# Patient Record
Sex: FEMALE | URBAN - METROPOLITAN AREA
[De-identification: names, ages, dates, MRNs, and addresses within clinical notes are randomized per-mention and may not be internally consistent; named-entity substitution may affect disease eponyms.]

---

## 2022-01-06 ENCOUNTER — APPOINTMENT (OUTPATIENT)
Dept: URBAN - METROPOLITAN AREA CLINIC 259 | Age: 37
Setting detail: DERMATOLOGY
End: 2022-01-06

## 2022-01-06 DIAGNOSIS — L65.0 TELOGEN EFFLUVIUM: ICD-10-CM

## 2022-01-06 PROCEDURE — OTHER COUNSELING: OTHER

## 2022-01-06 PROCEDURE — OTHER MIPS QUALITY: OTHER

## 2022-01-06 PROCEDURE — 99203 OFFICE O/P NEW LOW 30 MIN: CPT

## 2022-01-06 PROCEDURE — OTHER PRESCRIPTION: OTHER

## 2022-01-06 RX ORDER — SPIRONOLACTONE 50 MG/1
TABLET, FILM COATED ORAL
Qty: 30 | Refills: 2 | Status: ERX | COMMUNITY
Start: 2022-01-06

## 2022-01-06 ASSESSMENT — LOCATION DETAILED DESCRIPTION DERM: LOCATION DETAILED: RIGHT SUPERIOR PARIETAL SCALP

## 2022-01-06 ASSESSMENT — LOCATION ZONE DERM: LOCATION ZONE: SCALP

## 2022-01-06 ASSESSMENT — LOCATION SIMPLE DESCRIPTION DERM: LOCATION SIMPLE: SCALP

## 2022-01-06 NOTE — HPI: HAIR LOSS (PATIENT REPORTED)
Where Is Your Hair Loss Located?: Scalp
Please Specify Dates Of Recent Illness, Surgery, Weighloss, Childbirth, Or Increased Stress:: Increased stress
Additional Comments (Use Complete Sentences): Patient reports her other derm recommended supplements and rogiane. Patient reports she would like a second opinion due unresolved concerns.  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

## 2022-01-06 NOTE — PROCEDURE: MIPS QUALITY
Detail Level: Generalized
Quality 110: Preventive Care And Screening: Influenza Immunization: Influenza Immunization Ordered or Recommended, but not Administered due to system reason
Quality 130: Documentation Of Current Medications In The Medical Record: Current Medications Documented
Quality 431: Preventive Care And Screening: Unhealthy Alcohol Use - Screening: Patient identified as an unhealthy alcohol user when screened for unhealthy alcohol use using a systematic screening method and received brief counseling
Quality 226: Preventive Care And Screening: Tobacco Use: Screening And Cessation Intervention: Patient screened for tobacco use and is an ex/non-smoker

## 2022-01-10 NOTE — PROGRESS NOTES
[facility] Breast Surgery Consultation    HPI:   Karissa Rivera is a 36 year old female who is seen in consultation at the request of Dr. Ramírez for evaluation of increased lifetime risk of breast cancer.     She has a family history of her mother having breast cancer 1st at 40yo and then again at 41yo. 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 Family history also signficant for maternal grandmother with breast cancer and pancreatic cancer. Karissa had genetic testing and was negative last year. She was previously followed with high risk screening when she was living in [place]. Her last MRI was in 2018. She has had a prior right breast biopsy which was benign in 2017. Her sister is positive for FAP gene mutation.     Karissa is here to establish care for high risk screening. She would also like to meet with medical oncology to establish care.     She has no breast concerns today. Her last imaging was a mammogram in February 2021 which was benign.     Hormonal history:   menarche 15,  no children (may consider children yet/unsure) premenopausal, 11 years continuous OCP use, no HRT, no fertility treatment.     Family history of breast cancer: Yes - as above  Family history of ovarian cancer:  No  Family history of colon cancer: No  Family history of prostate cancer: No      Past Medical History:  reviewed      Current Outpatient Medications:      levETIRAcetam (KEPPRA PO), , Disp: , Rfl:      norethindrone-ethinyl estradiol (AUROVELA FE 1/20) 1-20 MG-MCG tablet, Take 1 tablet by mouth daily, Disp: , Rfl:      Zonisamide (ZONEGRAN PO), , Disp: , Rfl:     Past Surgical History:  reviewed        Allergies   Allergen Reactions     Adhesive Tape      Peeled off skin         Social History:  Social History     Socioeconomic History     Marital status: Single     Spouse name: Not on file     Number of children: Not on file     Years of education: Not on file     Highest education level:  "Not on file   Occupational History     Not on file   Tobacco Use     Smoking status: Never Smoker     Smokeless tobacco: Never Used   Substance and Sexual Activity     Alcohol use: Yes     Alcohol/week: 2.0 standard drinks     Types: 2 Standard drinks or equivalent per week     Drug use: Never     Sexual activity: Not on file   Other Topics Concern     Not on file   Social History Narrative     Not on file     Social Determinants of Health     Financial Resource Strain: Not on file   Food Insecurity: Not on file   Transportation Needs: Not on file   Physical Activity: Not on file   Stress: Not on file   Social Connections: Not on file   Intimate Partner Violence: Not on file   Housing Stability: Not on file        ROS:  The 10 point review of systems is negative other than noted in the HPI and above.    PE:  Vitals: /82   Pulse 110   Ht 1.753 m (5' 9\")   Wt 54.4 kg (120 lb)   SpO2 99%   BMI 17.72 kg/m    General appearance: well-nourished, sitting comfortably, no apparent distress  Psych: normal affect, pleasant  HEENT:  Head normocephalic and atraumatic, pupils equal and round, conjunctivae clear, mucous membranes moist, external ears and nose normal  Neck: Supple without thyromegaly or masses  Lungs: Respirations unlabored  Lymphatic: No cervical, or supraclavicular lymphadenopathy  Extremities: Without edema  Musculoskeletal:  Normal station and gait  Neurologic: nonfocal, grossly intact times four extremities, alert and oriented times three  Psychiatric: Mood and affect are appropriate  Skin: Without lesions or rashes    Breast:  A bilateral breast exam was performed in the supine position.. Bilateral breasts were palpated in a circumferential clockwise fashion including the supraclavicular and axillary areas.   Breasts are symmetric. The skin is normal. Nipples are everted bilaterally. There are no masses in either breast. Tissue is dense in the upper outer breast and central breast.       Lymph:   "     No supraclavicular/infraclavicular adenopathy.   Axillary adenopathy: none    Assessment/Plan: Karissa Rivera is a 36 year old who presents with increased lifetime risk of breast cancer with calculated risk via ABRAHAM score calculator of 45% (breast imaging calculated risk to be >50%). We discussed options for high risk patients including high risk screening alternating mammogram with breast MRI Q 6 months and prophylactic mastectomies. She would like to continue with high risk screening. Orders placed for MRI now and would then recommend mammogram in 6 months. She would like to meet with medical oncology as well to establish care which is reasonable. She can follow with either medical oncology or myself for high risk screening going forward. She is considering risk reducing mastectomies at some point in the future pending further need for biopsies/etc. We did discuss that it is very reasurring that she has had genetic testing and is negative for the BARD gene mutation that her mother was found to have and that her risk may not be as high as the ABRAHAM score calculator predicts given this is not taken into account.     We did discuss the options for mastectomies including simple mastectomies (going flat), skin sparing mastectomies or nipple sparing mastectomies with reconstruction. She is a good candidate for nipple sparing mastectomies if she were to proceed with surgery for risk reduction.      30 minutes total time spent on the date of this encounter doing: chart review, review of test results, patient visit, physical exam, education, counseling, developing plan of care, and documenting.    Vale Ha MD      Please route or send letter to:  Primary Care Provider (PCP) and Referring Provider

## 2022-01-11 ENCOUNTER — OFFICE VISIT (OUTPATIENT)
Dept: SURGERY | Facility: CLINIC | Age: 37
End: 2022-01-11
Payer: COMMERCIAL

## 2022-01-11 ENCOUNTER — PATIENT OUTREACH (OUTPATIENT)
Dept: ONCOLOGY | Facility: CLINIC | Age: 37
End: 2022-01-11

## 2022-01-11 VITALS
HEIGHT: 69 IN | SYSTOLIC BLOOD PRESSURE: 124 MMHG | DIASTOLIC BLOOD PRESSURE: 82 MMHG | BODY MASS INDEX: 17.77 KG/M2 | HEART RATE: 110 BPM | OXYGEN SATURATION: 99 % | WEIGHT: 120 LBS

## 2022-01-11 DIAGNOSIS — Z91.89 AT HIGH RISK FOR BREAST CANCER: Primary | ICD-10-CM

## 2022-01-11 PROCEDURE — 99244 OFF/OP CNSLTJ NEW/EST MOD 40: CPT | Performed by: SURGERY

## 2022-01-11 RX ORDER — NORETHINDRONE ACETATE AND ETHINYL ESTRADIOL 1MG-20(21)
1 KIT ORAL DAILY
COMMUNITY

## 2022-01-11 ASSESSMENT — MIFFLIN-ST. JEOR: SCORE: 1293.7

## 2022-01-11 NOTE — NURSING NOTE
Breast Patients    BREAST PATIENTS (ALL)    1-Do you have any of the following symptoms? Other: none - family history  2-In which breast are you having the symptoms? NA  3-Have you had a Mammogram? Other Location:  [facility]    -  Date:  01/2021  4-Have you ever had a breast cyst drained? Yes  Side: right    5-Have you ever had a breast biopsy? Right  6-Have you ever had a Breast Cancer? No   7-Is there a history of Breast Cancer in your family? Yes   Relationship to you:    Mother  Grandmother  8-Have you ever had Ovarian Cancer? No  9-Is there a history of Ovarian Cancer in your family? No  10-Summarize your caffeine intake (i.e. coffee, tea, chocolate, soda etc.): 1 cup of coffee daily    BREAST PATIENTS (FEMALE)    11-What age did your periods begin? 15  12-Date your last menstrual period began? 25  13-Number of full-term pregnancies: 0  14-Your age when your first child was born? N/A  15-Did you nurse your children? NA  16-Are you pregnant now? No  17-Have you begun menopause? No  18-Have you had either ovary removed?No  19-Do you have breast implants? No   20-Do you use hormone replacement therapy?  No  21-Have you taken oral contraceptive pills?  Yes, For how many years?  11  22-Have you had an intrauterine device (IUD) placed?  No  23-What is your current bra size?  32 SUNG Bliss RN-BSN

## 2022-01-20 ENCOUNTER — RX ONLY (RX ONLY)
Age: 37
End: 2022-01-20

## 2022-01-20 RX ORDER — SPIRONOLACTONE 25 MG/1
TABLET, FILM COATED ORAL
Qty: 60 | Refills: 3 | Status: ERX | COMMUNITY
Start: 2022-01-20

## 2022-01-27 ENCOUNTER — HOSPITAL ENCOUNTER (OUTPATIENT)
Dept: MRI IMAGING | Facility: CLINIC | Age: 37
Discharge: HOME | End: 2022-01-27
Attending: SURGERY | Admitting: SURGERY
Payer: COMMERCIAL

## 2022-01-27 DIAGNOSIS — Z91.89 AT HIGH RISK FOR BREAST CANCER: ICD-10-CM

## 2022-01-27 PROCEDURE — 255N000002 HC RX 255 OP 636: Performed by: SURGERY

## 2022-01-27 PROCEDURE — 77049 MRI BREAST C-+ W/CAD BI: CPT

## 2022-01-27 PROCEDURE — A9585 GADOBUTROL INJECTION: HCPCS | Performed by: SURGERY

## 2022-01-27 RX ORDER — GADOBUTROL 604.72 MG/ML
5 INJECTION INTRAVENOUS ONCE
Status: COMPLETED | OUTPATIENT
Start: 2022-01-27 | End: 2022-01-27

## 2022-01-27 RX ADMIN — GADOBUTROL 5 ML: 604.72 INJECTION INTRAVENOUS at 16:46

## 2022-01-28 ENCOUNTER — TELEPHONE (OUTPATIENT)
Dept: MAMMOGRAPHY | Facility: CLINIC | Age: 37
End: 2022-01-28
Payer: COMMERCIAL

## 2022-01-28 NOTE — TELEPHONE ENCOUNTER
Karissa Rivera was called and given her 1/27/2022 Breast MRI Results:      FINDINGS:      Right Breast: There is marked  background parenchymal enhancement.     There are no areas of suspicious enhancement or lymphadenopathy.     Left Breast: There is marked  background parenchymal enhancement.     There are no areas of suspicious enhancement or lymphadenopathy.                                                                      IMPRESSION:   BI-RADS 1, NEGATIVE. No MR evidence of malignancy.      RECOMMENDED FOLLOW-UP:  Recommend routine annual screening mammography and breast MRI as  clinically indicated.      ELLEN LINDA MD       She will continue with her usual High risk Breast imaging routine.    Jennifer Rudolph BSN, RN  Procedure Nurse  [facility]  [phone]

## 2022-04-03 NOTE — PROGRESS NOTES
Patient scheduled for consult with Sonya Collier GC, on 3/30/22 but did not attend or cancel appointment. Request for no-show letter initiated and referral resent to NPS for completion of reschedule.

## 2022-06-23 ENCOUNTER — APPOINTMENT (OUTPATIENT)
Dept: URBAN - METROPOLITAN AREA CLINIC 259 | Age: 37
Setting detail: DERMATOLOGY
End: 2022-06-27

## 2022-06-23 VITALS — HEIGHT: 69 IN | WEIGHT: 120 LBS

## 2022-06-23 DIAGNOSIS — B07.8 OTHER VIRAL WARTS: ICD-10-CM

## 2022-06-23 DIAGNOSIS — L64.8 OTHER ANDROGENIC ALOPECIA: ICD-10-CM

## 2022-06-23 PROCEDURE — 99213 OFFICE O/P EST LOW 20 MIN: CPT

## 2022-06-23 PROCEDURE — OTHER PRESCRIPTION: OTHER

## 2022-06-23 PROCEDURE — OTHER MIPS QUALITY: OTHER

## 2022-06-23 PROCEDURE — OTHER COUNSELING: OTHER

## 2022-06-23 RX ORDER — SPIRONOLACTONE 25 MG/1
TABLET, FILM COATED ORAL
Qty: 180 | Refills: 3 | Status: ERX | COMMUNITY
Start: 2022-06-23

## 2022-06-23 ASSESSMENT — LOCATION ZONE DERM
LOCATION ZONE: FEET
LOCATION ZONE: SCALP

## 2022-06-23 ASSESSMENT — LOCATION DETAILED DESCRIPTION DERM
LOCATION DETAILED: RIGHT PLANTAR FOREFOOT OVERLYING 4TH METATARSAL
LOCATION DETAILED: LEFT SUPERIOR PARIETAL SCALP

## 2022-06-23 ASSESSMENT — LOCATION SIMPLE DESCRIPTION DERM
LOCATION SIMPLE: RIGHT PLANTAR SURFACE
LOCATION SIMPLE: SCALP

## 2022-07-22 ENCOUNTER — HOSPITAL ENCOUNTER (OUTPATIENT)
Dept: MAMMOGRAPHY | Facility: CLINIC | Age: 37
Discharge: HOME | End: 2022-07-22
Attending: SURGERY | Admitting: SURGERY
Payer: COMMERCIAL

## 2022-07-22 DIAGNOSIS — Z91.89 AT HIGH RISK FOR BREAST CANCER: ICD-10-CM

## 2022-07-22 PROCEDURE — 77067 SCR MAMMO BI INCL CAD: CPT

## 2022-12-06 ENCOUNTER — OFFICE VISIT (OUTPATIENT)
Dept: NEUROLOGY | Facility: CLINIC | Age: 37
End: 2022-12-06
Payer: COMMERCIAL

## 2022-12-06 VITALS
HEIGHT: 69 IN | HEART RATE: 103 BPM | TEMPERATURE: 97 F | BODY MASS INDEX: 17.77 KG/M2 | WEIGHT: 120 LBS | SYSTOLIC BLOOD PRESSURE: 116 MMHG | DIASTOLIC BLOOD PRESSURE: 83 MMHG

## 2022-12-06 DIAGNOSIS — G40.909 NONINTRACTABLE EPILEPSY WITHOUT STATUS EPILEPTICUS, UNSPECIFIED EPILEPSY TYPE (H): Primary | ICD-10-CM

## 2022-12-06 LAB — LEVETIRACETAM SERPL-MCNC: 21.8 ΜG/ML (ref 10–40)

## 2022-12-06 RX ORDER — SPIRONOLACTONE 25 MG/1
25 TABLET ORAL 2 TIMES DAILY
COMMUNITY
Start: 2022-09-19

## 2022-12-06 RX ORDER — LEVETIRACETAM 750 MG/1
750 TABLET ORAL 2 TIMES DAILY
Qty: 180 TABLET | Refills: 3 | Status: SHIPPED | OUTPATIENT
Start: 2022-12-06 | End: 2023-12-31

## 2022-12-06 RX ORDER — ZONISAMIDE 100 MG/1
200 CAPSULE ORAL AT BEDTIME
Qty: 180 CAPSULE | Refills: 3 | Status: SHIPPED | OUTPATIENT
Start: 2022-12-06 | End: 2024-02-12

## 2022-12-06 RX ORDER — ZONISAMIDE 100 MG/1
CAPSULE ORAL
COMMUNITY
Start: 2022-10-24 | End: 2022-12-06

## 2022-12-06 RX ORDER — LEVETIRACETAM 750 MG/1
1 TABLET ORAL 2 TIMES DAILY
COMMUNITY
Start: 2022-03-03 | End: 2022-12-06

## 2022-12-06 ASSESSMENT — PAIN SCALES - GENERAL: PAINLEVEL: NO PAIN (0)

## 2022-12-06 NOTE — LETTER
"2022     RE: Karissa Rivera  : 1985   MRN: [id number]      Dear Colleague,    Thank you for referring your patient, Karissa Rivera, to the [facility] EPILEPSY CARE at [facility]. Please see a copy of my visit note below.    [facility] Epilepsy Care History and Physical       Patient:  Karissa Rivera  :  1985   Age:  37 year old   Today's Office Visit:  2022    Referring Provider:    Referred Self, MD  No address on file    History of Present Illness:  Karissa Rivera is 37 year old right handed who presents with history of epilepsy. She is transferring care from [facility] (she lost confidence in provider regarding family planning and lack of medical knowledge). Seizure started at age elementary school, early on seizure consisted of \"head shaking at the sun\", early on she took depakote. She transitioned to lamotrigine because depakote impacted learning, she took lamotrigine and depakote. After puberty she generalized tonic-clonic convulsion. She recall one twitch and she dropped coffee mug age 11.   Her seizure stopped after started zonisamide since . She has no cognitive slowing or kidney stones on zonisamide. She has \"runny nose and drowsiness from zonisamide\". She has fatigue which may also be secondary to levetiracetam. No mood issues.   Seizure type 1: In elementary school she had episodes in which she \"looked at sun and shook her head\". I suspect these might have been absence, nonetheless, they stopped with antiepileptic drug.   Seizure type 2: generalized tonic-clonic convulsion, these started in puberty and stopped after starting zonisamide in . The ketogenic also helped her convulsions.   Antiepileptic drug:   Zonisamide 100 mg at night   Levetiracetam 750 mg twice a day   Epilepsy Risk Factors:  Patient has no history of encephalitis/meningitis, no history of stroke, no history of tumor, no history of traumatic brain injury.  The " patient had a normal birth and delivery.  No developmental delays noted.  No febrile seizures.  No family members with epilepsy.   Aunt had fainting spells.   Precipitating factors:   photosensitive   Current Outpatient Medications   Medication Sig Dispense Refill     levETIRAcetam (KEPPRA) 750 MG tablet Take 1 tablet by mouth 2 times daily       norethindrone-ethinyl estradiol (JUNEL FE 1/20) 1-20 MG-MCG tablet Take 1 tablet by mouth daily       spironolactone (ALDACTONE) 25 MG tablet Take 25 mg by mouth 2 times daily       zonisamide (ZONEGRAN) 100 MG capsule        levETIRAcetam (KEPPRA PO)        Zonisamide (ZONEGRAN PO)        Perceived AED Side Effects: Yes  Medication Notes:   AED Medication Compliance:  compliant most of the time  Using a pill box:  Yes  Past AEDs:    Depakote - caused learning issues   Lamotrigine - no side effects   Levetiracetam - 750 mg twice a day currently, in high school they tired to increase levetiracetam and she had side effects.   Past Medical History:   Diagnosis Date     Seizures (H)       No past surgical history on file.  Family History   Problem Relation Age of Onset     Cerebrovascular Disease Mother      Breast Cancer Mother      Gallbladder Disease Mother      Breast Cancer Father      Cancer Father      Gallbladder Disease Father      Breast Cancer Sister         high risk     Colon Cancer Sister         high risk      Psycho-Social History: Karissa Rivera currently lives with boyfriend, she is in school for some master level classes and plans to go into marriage and family counseling. 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 Born in [place] and moved to [place] age 9, then moved to [place] age [age] and went to high school, then moved to [place]. Moved back to [place] 2020.   Currently, patient denies feeling depressed, denies feeling anhedonia, denies suicidal  thoughts, and denies having feelings of excessive  "guilt/worthlessness.  Does not smoke, rare alcohol use, no recreational drug use. We reviewed importance of mental and emotional wellbeing and impact on health.   Driving:  Currently patient is:  Driving: Patient was made aware of state driving laws. Currently meets criteria to continue to drive.  Previous Evaluations for Epilepsy:   EEG: none on file, we will have to get from [facility] data   MRI of Brain: this was completed in 2000 in [place]  Exam:    /83 (BP Location: Left arm, Patient Position: Sitting, Cuff Size: Adult Regular)   Pulse 103   Temp 97  F (36.1  C) (Temporal)   Ht 5' 9\" (175.3 cm)   Wt 120 lb (54.4 kg)   BMI 17.72 kg/m       Wt Readings from Last 5 Encounters:   12/06/22 120 lb (54.4 kg)   01/11/22 120 lb (54.4 kg)       Alert, orientated, speech is fluent, pupils are equal, round, and reactive to light, face symmetric, no pronator drip, equal  strength, reflexes are symmetric, normal to light touch with no sensory deficits noted, finger to nose normal, no focal deficits noted.Gait is stable. Able to tandem gait.       Impression:    Generalized Epilepsy     Discussion:   37-year-old female presents with generalized epilepsy based on clinical presentation.  She states her seizure stopped when she started taking zonisamide.  She would like to plan for her future pregnancy.  We spent an extensive amount of time reviewing seizure medications and teratogenicity.  She is not wanting to take lamotrigine and feels it was not helpful for her in the past.  I reviewed the existing literature on zonisamide in the pregnancy registries that had around 112 patients on this medication with less than 5% risk of teratogenicity.  Since Zonegran is an important seizure medication for her I encouraged her to continue it with levetiracetam.  I encouraged her to pursue her family planning goals based on existing knowledge of zonisamide teratogenicity.      Plan :      Continue levetiracetam 750 mg twice a day "   Continue zonisamide 200 mg pm   Check levels   Recommended ambulatory EEG, sleep 4 hours night prior EEG hookup    Need to get records from [facility] and [place]  For pregnancy - we may proceed with zonisamide and levetiracetam.     I spent 71 minutes in total today to provide comprehensive  medical care.   I spent 5 minutes writing the note and placing orders.   I spent 2 minutes  reviewing the chart.     The rest of the time was spent with the patient in face to face interview. During this time key medical decisions were made with review of medical chart prior to visit, visit with patient, counseling/education, and post visit work, including documentation of note on the day of visit. I addressed all questions the patient/caregiver raised in regards to epilepsy or related medical questions.       Christal Murray MD   Epilepsy Staff

## 2022-12-06 NOTE — LETTER
Patient:  Karissa Rivera  :   1985  MRN:     [id number]        Ms.Anne DINA Rivera  [address] [address]        2022    Dear ,    We are writing to inform you of your test results.    Your test results fall within the expected range(s) or remain unchanged from previous results.  Please continue with current treatment plan.    Resulted Orders   Keppra (Levetiracetam) Level   Result Value Ref Range    Keppra (Levetiracetam) Level 21.8 10.0 - 40.0  g/mL   Zonisamide Level Quantitative   Result Value Ref Range    Zonisamide Level Quant 10 10 - 40 ug/mL      Comment:      INTERPRETIVE INFORMATION: Zonisamide    Therapeutic range: Not well established.    Toxic: Greater than 80 ug/mL    The proposed therapeutic range for seizure control is 10-40   ug/mL. Toxic concentrations may cause coma, seizures and   cardiac abnormalities. Pharmacokinetics varies widely,   particularly with co-medications and/or compromised renal   function.  Performed By: [facility]  [address] [place] [id number]  : Frankie Montano MD, PhD       Christal Murray MD               [id number]  1985

## 2022-12-06 NOTE — PROGRESS NOTES
"[facility]       Patient:  Karissa Rivera  :  1985   Age:  37 year old   Today's Office Visit:  2022    Referring Provider:    Referred Self, MD  No address on file    History of Present Illness:  Karissa Rivera is 37 year old right handed who presents with history of epilepsy. She is transferring care from [facility] (she lost confidence in provider regarding family planning and lack of medical knowledge). Seizure started at age elementary school, early on seizure consisted of \"head shaking at the sun\", early on she took depakote. She transitioned to lamotrigine because depakote impacted learning, she took lamotrigine and depakote. After puberty she generalized tonic-clonic convulsion. She recall one twitch and she dropped coffee mug age 11.   Her seizure stopped after started zonisamide since . She has no cognitive slowing or kidney stones on zonisamide. She has \"runny nose and drowsiness from zonisamide\". She has fatigue which may also be secondary to levetiracetam. No mood issues.   Seizure type 1: In elementary school she had episodes in which she \"looked at sun and shook her head\". I suspect these might have been absence, nonetheless, they stopped with antiepileptic drug.   Seizure type 2: generalized tonic-clonic convulsion, these started in puberty and stopped after starting zonisamide in . The ketogenic also helped her convulsions.   Antiepileptic drug:   Zonisamide 100 mg at night   Levetiracetam 750 mg twice a day   Epilepsy Risk Factors:  Patient has no history of encephalitis/meningitis, no history of stroke, no history of tumor, no history of traumatic brain injury.  The patient had a normal birth and delivery.  No developmental delays noted.  No febrile seizures.  No family members with epilepsy.   Aunt had fainting spells.   Precipitating factors:   photosensitive   Current Outpatient Medications   Medication Sig Dispense Refill     levETIRAcetam " (KEPPRA) 750 MG tablet Take 1 tablet by mouth 2 times daily       norethindrone-ethinyl estradiol (JUNEL FE 1/20) 1-20 MG-MCG tablet Take 1 tablet by mouth daily       spironolactone (ALDACTONE) 25 MG tablet Take 25 mg by mouth 2 times daily       zonisamide (ZONEGRAN) 100 MG capsule        levETIRAcetam (KEPPRA PO)        Zonisamide (ZONEGRAN PO)        Perceived AED Side Effects: Yes  Medication Notes:   AED Medication Compliance:  compliant most of the time  Using a pill box:  Yes  Past AEDs:    Depakote - caused learning issues   Lamotrigine - no side effects   Levetiracetam - 750 mg twice a day currently, in high school they tired to increase levetiracetam and she had side effects.   Past Medical History:   Diagnosis Date     Seizures (H)       No past surgical history on file.  Family History   Problem Relation Age of Onset     Cerebrovascular Disease Mother      Breast Cancer Mother      Gallbladder Disease Mother      Breast Cancer Father      Cancer Father      Gallbladder Disease Father      Breast Cancer Sister         high risk     Colon Cancer Sister         high risk      Psycho-Social History: Karissa Rivera currently lives with boyfriend, she is in school for some master level classes and plans to go into marriage and family counseling. 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 Born in [place] and moved to [place] age 9, then moved to [place] age [age] and went to high school, then moved to [place]. Moved back to [place] 2020.   Currently, patient denies feeling depressed, denies feeling anhedonia, denies suicidal  thoughts, and denies having feelings of excessive guilt/worthlessness.  Does not smoke, rare alcohol use, no recreational drug use. We reviewed importance of mental and emotional wellbeing and impact on health.   Driving:  Currently patient is:  Driving: Patient was made aware of state driving laws. Currently meets criteria to continue to  "drive.  Previous Evaluations for Epilepsy:   EEG: none on file, we will have to get from [facility] data   MRI of Brain: this was completed in 2000 in [place]  Exam:    /83 (BP Location: Left arm, Patient Position: Sitting, Cuff Size: Adult Regular)   Pulse 103   Temp 97  F (36.1  C) (Temporal)   Ht 5' 9\" (175.3 cm)   Wt 120 lb (54.4 kg)   BMI 17.72 kg/m       Wt Readings from Last 5 Encounters:   12/06/22 120 lb (54.4 kg)   01/11/22 120 lb (54.4 kg)       Alert, orientated, speech is fluent, pupils are equal, round, and reactive to light, face symmetric, no pronator drip, equal  strength, reflexes are symmetric, normal to light touch with no sensory deficits noted, finger to nose normal, no focal deficits noted.Gait is stable. Able to tandem gait.       Impression:    Generalized Epilepsy     Discussion:   37-year-old female presents with generalized epilepsy based on clinical presentation.  She states her seizure stopped when she started taking zonisamide.  She would like to plan for her future pregnancy.  We spent an extensive amount of time reviewing seizure medications and teratogenicity.  She is not wanting to take lamotrigine and feels it was not helpful for her in the past.  I reviewed the existing literature on zonisamide in the pregnancy registries that had around 112 patients on this medication with less than 5% risk of teratogenicity.  Since Zonegran is an important seizure medication for her I encouraged her to continue it with levetiracetam.  I encouraged her to pursue her family planning goals based on existing knowledge of zonisamide teratogenicity.      Plan :      Continue levetiracetam 750 mg twice a day   Continue zonisamide 200 mg pm   Check levels   Recommended ambulatory EEG, sleep 4 hours night prior EEG hookup    Need to get records from [facility] and [place]  For pregnancy - we may proceed with zonisamide and levetiracetam.       I spent 71 minutes in total today to provide comprehensive  " medical care.   I spent 5 minutes writing the note and placing orders.   I spent 2 minutes  reviewing the chart.     The rest of the time was spent with the patient in face to face interview. During this time key medical decisions were made with review of medical chart prior to visit, visit with patient, counseling/education, and post visit work, including documentation of note on the day of visit. I addressed all questions the patient/caregiver raised in regards to epilepsy or related medical questions.           Christal Murray MD   Epilepsy Staff

## 2022-12-06 NOTE — PATIENT INSTRUCTIONS
Continue levetiracetam 750 mg twice a day   Continue zonisamide 200 mg pm   Check levels   Recommend ambulatory EEG, sleep 4 hours night prior EEG hookup  - do not drive sleep deprived   Need to get records from [facility] and [place]  For pregnancy - we may proceed with zonisamide and levetiracetam, talk to OB get off OCP, and prenatal vitamin.       AMBULATORY EEG  I ordered an ambulatory EEG on this visit.  EEG test will allow us to look for seizure like activity. An ambulatory EEG is a take-home EEG.  You will have electrodes put on your scalp at the clinic and a head box that you can wear as a bag.  You will have to come to clinic on subsequent days to have EEG data downloaded and electrodes re-glued.     ambulatory EEG options: 24 hour EEG. Day 1 come to [facility] and get EEG put on head and you may go home. Day 2: Come to clinic to have EEG leads removed.     You have to schedule an appointment for each visit. If you have a target spell or seizure press the event button and document what you felt or experienced. Please schedule this study at your convience. It is best to schedule a follow-up in person visit to review your EEG results and clinical implications.  If you are not able to come into the clinic for an appointment, I am happy to review your results over the phone.  Kindly, schedule the most appropriate phone visit for you and your schedule after the EEG is completed. If you have MyChart, I may also send you the results via [email or web address] messaging if appropriate.     If patient has a target spell of staring or shaking, lizzy record spell on video. Make sure to obtain consent for video recording. Review this video with Trevor at next visit. At the onset ask patient to remember a word, follow a simple command (i.e point to the ceiling or point to the door). After the spell is completed ask to recall the word that was given.     How to Contact [facility] Epilepsy [facility]:   Send my chart message on EPIC to Dr. Murray OR Call [facility]  [facility] [phone] to speak with staff for assistance     If you need paper work completed please contact our office and allow 2-3 weeks for turnaround time, if you do not hear from us, please send [employer] message again.     Christal Murray MD

## 2022-12-08 LAB — ZONISAMIDE SERPL-MCNC: 10 UG/ML

## 2023-03-27 ENCOUNTER — ANCILLARY PROCEDURE (OUTPATIENT)
Dept: NEUROLOGY | Facility: CLINIC | Age: 38
End: 2023-03-27
Payer: COMMERCIAL

## 2023-03-27 DIAGNOSIS — G40.909 NONINTRACTABLE EPILEPSY WITHOUT STATUS EPILEPTICUS, UNSPECIFIED EPILEPSY TYPE (H): ICD-10-CM

## 2023-04-21 ENCOUNTER — CARE COORDINATION (OUTPATIENT)
Dept: SURGERY | Facility: CLINIC | Age: 38
End: 2023-04-21
Payer: COMMERCIAL

## 2023-04-21 ENCOUNTER — VIRTUAL VISIT (OUTPATIENT)
Dept: NEUROLOGY | Facility: CLINIC | Age: 38
End: 2023-04-21
Payer: COMMERCIAL

## 2023-04-21 VITALS — HEIGHT: 69 IN | BODY MASS INDEX: 17.77 KG/M2 | WEIGHT: 120 LBS

## 2023-04-21 DIAGNOSIS — G40.909 NONINTRACTABLE EPILEPSY WITHOUT STATUS EPILEPTICUS, UNSPECIFIED EPILEPSY TYPE (H): Primary | ICD-10-CM

## 2023-04-21 RX ORDER — ESCITALOPRAM OXALATE 10 MG/1
1 TABLET ORAL DAILY
COMMUNITY
Start: 2023-03-14

## 2023-04-21 ASSESSMENT — PATIENT HEALTH QUESTIONNAIRE - PHQ9: SUM OF ALL RESPONSES TO PHQ QUESTIONS 1-9: 0

## 2023-04-21 ASSESSMENT — PAIN SCALES - GENERAL: PAINLEVEL: NO PAIN (0)

## 2023-04-21 NOTE — LETTER
"2023       RE: Karissa Rivera  : 1985   MRN: [id number]      Dear Colleague,    Thank you for referring your patient, Karissa Rivera, to the [facility] EPILEPSY CARE at [facility]. Please see a copy of my visit note below.    Virtual Visit Details    Type of service:  Telephone Visit   Phone call duration: 18 minutes     [facility]    Patient:  Karissa Rivera  :  1985   Age:  37 year old   Today's Office Visit:  2023    Epilepsy Data:   Karissa Rivera is 37 year old right handed who presents with history of epilepsy. She is transferring care from [facility] (she lost confidence in provider regarding family planning and lack of medical knowledge). Seizure started at age elementary school, early on seizure consisted of \"head shaking at the sun\", early on she took depakote. She transitioned to lamotrigine because depakote impacted learning, she took lamotrigine and depakote. After puberty she generalized tonic-clonic convulsion. She recall one twitch and she dropped coffee mug age 11. Her seizure stopped after started zonisamide since . She has no cognitive slowing or kidney stones on zonisamide. She has \"runny nose and drowsiness from zonisamide\". She has fatigue which may also be secondary to levetiracetam. No mood issues.     Interval History:   No seizures since . Since last visit patient has not had any seizures and is adherent to seizure medications as recommended. Patient denies any convulsions or spells concerning for seizure activity. , Patient has missed seizure medications. On this visit I reviewed the importance of taking seizure medication as prescribed. Missed seizure medications may results in breakthrough seizure that may potentially be life threatening or cause physical injury. I encouraged use of pill box, phone alarm, and routine reminders to increased seizure medication adherence. Patient/caregiver " "expressed understanding of this and are agreeable to plan of care.  On current seizure medications, patient denies experiencing dizziness, no double vision, no nausea, no vomiting, no abdominal pain, no mood changes. Since our last visit patient had no ER visits, no hospitalizations, and had no significant fall with trauma. We reviewed seizure plan of care and addressed all question in regards to patient care. Patient/caregiver are agreeable to plan of care.   and On this visit we reviewed the importance of self care (sleep, stress management, caffeine intake, adherence to seizure medications, avoid excessive alcohol use, and drug use) and this impact on seizures.      Seizure type 1: In elementary school she had episodes in which she \"looked at sun and shook her head\". I suspect these might have been absence, nonetheless, they stopped with antiepileptic drug.   Seizure type 2: generalized tonic-clonic convulsion, these started in puberty and stopped after starting zonisamide in 2007. The ketogenic also helped her convulsions. Last generalized tonic-clonic convulsion was 2007.   Antiepileptic drug:   Zonisamide 100 mg at night   Levetiracetam 750 mg twice a day     Current Outpatient Medications   Medication Sig Dispense Refill    escitalopram (LEXAPRO) 10 MG tablet Take 1 tablet by mouth daily      levETIRAcetam (KEPPRA) 750 MG tablet Take 1 tablet (750 mg) by mouth 2 times daily 180 tablet 3    norethindrone-ethinyl estradiol (JUNEL FE 1/20) 1-20 MG-MCG tablet Take 1 tablet by mouth daily      zonisamide (ZONEGRAN) 100 MG capsule Take 2 capsules (200 mg) by mouth At Bedtime 180 capsule 3    spironolactone (ALDACTONE) 25 MG tablet Take 25 mg by mouth 2 times daily (Patient not taking: Reported on 4/21/2023)       Perceived AED Side Effects: Yes  Medication Notes:   AED Medication Compliance:  compliant most of the time  Using a pill box:  Yes  Past AEDs:    Depakote - caused learning issues   Lamotrigine - no side " "effects   Levetiracetam - 750 mg twice a day currently, in high school they tired to increase levetiracetam and she had side effects.   Psycho-Social History: Karissa Rivera currently lives with boyfriend, she is in school for some master level classes and plans to go into marriage and family counseling. 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 Born in [place] and moved to [place] age 9, then moved to [place] age [age] and went to high school, then moved to [place]. Moved back to [place] 2020.   Currently, patient denies feeling depressed, denies feeling anhedonia, denies suicidal  thoughts, and denies having feelings of excessive guilt/worthlessness.  Does not smoke, rare alcohol use, no recreational drug use. We reviewed importance of mental and emotional wellbeing and impact on health.   Driving:  Currently patient is:  Driving: Patient was made aware of state driving laws. Currently meets criteria to continue to drive.  Previous Evaluations for Epilepsy:   EEG: none on file, we will have to get from [facility] data   MRI of Brain: this was completed in 2000 in [place]  Exam:    Ht 5' 9\" (175.3 cm)   Wt 120 lb (54.4 kg)   BMI 17.72 kg/m       Wt Readings from Last 5 Encounters:   04/21/23 120 lb (54.4 kg)   12/06/22 120 lb (54.4 kg)   01/11/22 120 lb (54.4 kg)       Alert, orientated, speech is fluent      Impression:    Generalized Epilepsy     Discussion:   37-year-old female presents with generalized epilepsy based on clinical presentation.  She states her seizure stopped when she started taking zonisamide. Per Dr. Vanessa Tomas note EEG was notable generalized epileptiform discharges.     She would like to plan for her future pregnancy.  We spent most of our time reviewing seizure medications and teratogenicity.  She is not wanting to take lamotrigine and feels it was not helpful for her in the past.  I reviewed the existing literature on zonisamide in the pregnancy " registries that had around 112 patients on this medication with less than 5% risk of teratogenicity.  Since Zonegran is an important seizure medication for her I encouraged her to continue it with levetiracetam.  I encouraged her to pursue her family planning goals based on existing knowledge of zonisamide teratogenicity.      Plan :      Continue levetiracetam 750 mg twice a day   Continue zonisamide 200 mg pm   Check levels   For pregnancy - we may proceed with zonisamide and levetiracetam.           Again, thank you for allowing me to participate in the care of your patient.      Sincerely,    Christal Murray MD

## 2023-04-21 NOTE — PROGRESS NOTES
Karissa has been following with Dr. Ha because of an increased lifetime risk of breast cancer. She was last seen by Dr. Ha on 1/11/22 and decided to pursue high risk screening for now.     She last had a breast MRI on 1/27/22 and last mammogram on 7/22/23. She would be due for a MRI now. I will route this message to Dr. Ha and see if she would like any imaging done before Karissa's upcoming appointment on 4/28/23.     Monica Hinton RN on 4/21/2023 at 9:20 AM  Monica Hinton RN, BSN, PHN  Breast Care Nurse Coordinator  [facility]- [facility] [place]  [phone]

## 2023-04-21 NOTE — PROGRESS NOTES
"Virtual Visit Details    Type of service:  Telephone Visit   Phone call duration: 18 minutes     [facility]ilepsy [facility] Progress Note    Patient:  Karissa Rivera  :  1985   Age:  37 year old   Today's Office Visit:  2023    Epilepsy Data:   Karissa Rivera is 37 year old right handed who presents with history of epilepsy. She is transferring care from [facility] (she lost confidence in provider regarding family planning and lack of medical knowledge). Seizure started at age elementary school, early on seizure consisted of \"head shaking at the sun\", early on she took depakote. She transitioned to lamotrigine because depakote impacted learning, she took lamotrigine and depakote. After puberty she generalized tonic-clonic convulsion. She recall one twitch and she dropped coffee mug age 11. Her seizure stopped after started zonisamide since . She has no cognitive slowing or kidney stones on zonisamide. She has \"runny nose and drowsiness from zonisamide\". She has fatigue which may also be secondary to levetiracetam. No mood issues.     Interval History:   No seizures since . Since last visit patient has not had any seizures and is adherent to seizure medications as recommended. Patient denies any convulsions or spells concerning for seizure activity. , Patient has missed seizure medications. On this visit I reviewed the importance of taking seizure medication as prescribed. Missed seizure medications may results in breakthrough seizure that may potentially be life threatening or cause physical injury. I encouraged use of pill box, phone alarm, and routine reminders to increased seizure medication adherence. Patient/caregiver expressed understanding of this and are agreeable to plan of care.  On current seizure medications, patient denies experiencing dizziness, no double vision, no nausea, no vomiting, no abdominal pain, no mood changes. Since our last visit patient had no ER visits, no hospitalizations, " "and had no significant fall with trauma. We reviewed seizure plan of care and addressed all question in regards to patient care. Patient/caregiver are agreeable to plan of care.   and On this visit we reviewed the importance of self care (sleep, stress management, caffeine intake, adherence to seizure medications, avoid excessive alcohol use, and drug use) and this impact on seizures.      Seizure type 1: In elementary school she had episodes in which she \"looked at sun and shook her head\". I suspect these might have been absence, nonetheless, they stopped with antiepileptic drug.   Seizure type 2: generalized tonic-clonic convulsion, these started in puberty and stopped after starting zonisamide in 2007. The ketogenic also helped her convulsions. Last generalized tonic-clonic convulsion was 2007.   Antiepileptic drug:   Zonisamide 100 mg at night   Levetiracetam 750 mg twice a day     Current Outpatient Medications   Medication Sig Dispense Refill     escitalopram (LEXAPRO) 10 MG tablet Take 1 tablet by mouth daily       levETIRAcetam (KEPPRA) 750 MG tablet Take 1 tablet (750 mg) by mouth 2 times daily 180 tablet 3     norethindrone-ethinyl estradiol (JUNEL FE 1/20) 1-20 MG-MCG tablet Take 1 tablet by mouth daily       zonisamide (ZONEGRAN) 100 MG capsule Take 2 capsules (200 mg) by mouth At Bedtime 180 capsule 3     spironolactone (ALDACTONE) 25 MG tablet Take 25 mg by mouth 2 times daily (Patient not taking: Reported on 4/21/2023)       Perceived AED Side Effects: Yes  Medication Notes:   AED Medication Compliance:  compliant most of the time  Using a pill box:  Yes  Past AEDs:    Depakote - caused learning issues   Lamotrigine - no side effects   Levetiracetam - 750 mg twice a day currently, in high school they tired to increase levetiracetam and she had side effects.   Psycho-Social History: Karissa Rivera currently lives with boyfriend, she is in school for some master level classes and plans to go into " "marriage and family counseling. 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 Born in [place] and moved to [place] age 9, then moved to [place] age [age] and went to high school, then moved to [place]. Moved back to [place] 2020.   Currently, patient denies feeling depressed, denies feeling anhedonia, denies suicidal  thoughts, and denies having feelings of excessive guilt/worthlessness.  Does not smoke, rare alcohol use, no recreational drug use. We reviewed importance of mental and emotional wellbeing and impact on health.   Driving:  Currently patient is:  Driving: Patient was made aware of state driving laws. Currently meets criteria to continue to drive.  Previous Evaluations for Epilepsy:   EEG: none on file, we will have to get from [facility] data   MRI of Brain: this was completed in 2000 in [place]  Exam:    Ht 5' 9\" (175.3 cm)   Wt 120 lb (54.4 kg)   BMI 17.72 kg/m       Wt Readings from Last 5 Encounters:   04/21/23 120 lb (54.4 kg)   12/06/22 120 lb (54.4 kg)   01/11/22 120 lb (54.4 kg)       Alert, orientated, speech is fluent      Impression:    Generalized Epilepsy     Discussion:   37-year-old female presents with generalized epilepsy based on clinical presentation.  She states her seizure stopped when she started taking zonisamide. Per Dr. Vanessa Tomas note EEG was notable generalized epileptiform discharges.     She would like to plan for her future pregnancy.  We spent most of our time reviewing seizure medications and teratogenicity.  She is not wanting to take lamotrigine and feels it was not helpful for her in the past.  I reviewed the existing literature on zonisamide in the pregnancy registries that had around 112 patients on this medication with less than 5% risk of teratogenicity.  Since Zonegran is an important seizure medication for her I encouraged her to continue it with levetiracetam.  I encouraged her to pursue her family planning goals based on existing " knowledge of zonisamide teratogenicity.      Plan :      Continue levetiracetam 750 mg twice a day   Continue zonisamide 200 mg pm   Check levels   For pregnancy - we may proceed with zonisamide and levetiracetam.     Christal Murray MD   Epilepsy Staff

## 2023-04-21 NOTE — NURSING NOTE
Verbal consent from pt to pull in records through care everywhere.     Is the patient currently in the state of [place]? YES    Visit mode:TELEPHONE    If the visit is dropped, the patient can be reconnected by: TELEPHONE VISIT: Phone number: [phone]    Will anyone else be joining the visit? NO    How would you like to obtain your AVS? MyChart    Are changes needed to the allergy or medication list? NO    Reason for visit: Telephone (EEG follow up )    Medication and allergies have been reviewed.     Bud Jay, VF

## 2023-04-28 ENCOUNTER — OFFICE VISIT (OUTPATIENT)
Dept: SURGERY | Facility: CLINIC | Age: 38
End: 2023-04-28
Payer: COMMERCIAL

## 2023-04-28 VITALS
HEIGHT: 69 IN | WEIGHT: 120 LBS | BODY MASS INDEX: 17.77 KG/M2 | HEART RATE: 89 BPM | RESPIRATION RATE: 16 BRPM | OXYGEN SATURATION: 100 % | DIASTOLIC BLOOD PRESSURE: 64 MMHG | SYSTOLIC BLOOD PRESSURE: 102 MMHG

## 2023-04-28 DIAGNOSIS — Z12.39 BREAST CANCER SCREENING, HIGH RISK PATIENT: Primary | ICD-10-CM

## 2023-04-28 PROCEDURE — 99213 OFFICE O/P EST LOW 20 MIN: CPT | Performed by: SURGERY

## 2023-04-28 RX ORDER — TRETINOIN 0.025 %
CREAM (GRAM) TOPICAL
COMMUNITY
Start: 2023-03-14

## 2023-04-28 RX ORDER — FLUCONAZOLE 150 MG/1
TABLET ORAL
COMMUNITY

## 2023-04-28 RX ORDER — ALUMINUM CHLORIDE 20 %
SOLUTION, NON-ORAL TOPICAL
COMMUNITY
Start: 2022-09-03

## 2023-04-28 RX ORDER — LEVETIRACETAM 750 MG/1
1 TABLET ORAL 2 TIMES DAILY
COMMUNITY

## 2023-04-28 NOTE — PROGRESS NOTES
[facility] Follow Up Note    CHIEF COMPLAINT:  Increased lifetime risk for breast cancer    HISTORY OF PRESENT ILLNESS:  Karissa Rviera is a 37 year old female who is seen in follow up for increased lifetime risk for breast cancer.    She has a family history of her mother having breast cancer 1st at 38yo and then again at 41yo. 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 Family history also signficant for maternal grandmother with breast cancer and pancreatic cancer. Karissa had genetic testing and was negative last year. She was previously followed with high risk screening when she was living in [place]. Her last MRI was in 2018. She has had a prior right breast biopsy which was benign in 2017. Her sister is positive for FAP gene mutation.  She does have a personal history of seizures and a diagnosis of epilepsy.    She had a screening mammogram on 7/22/2022 which was benign.  Her breast tissue is extremely dense lowering the sensitivity of mammography.  She had a breast MRI on 1/27/2022.    Today she reports no specific concerns about either breast.  She has not been having any breast pain or nipple discharge.  She is currently engaged and her and her fiancé have discussed the possibility of having children.  She is wondering what imaging would look like for screening if she were to have a pregnancy.    Hormonal history:   menarche 15,  no children (may consider children yet/unsure) premenopausal, 11 years continuous OCP use, no HRT, no fertility treatment.      Family history of breast cancer: Yes - as above  Family history of ovarian cancer:  No  Family history of colon cancer: No  Family history of prostate cancer: No    REVIEW OF SYSTEMS:  Constitutional:  Negative for chills, fatigue, fever and weight change.  Eyes:  Negative for blurred vision, eye pain and photophobia.  ENT:  Negative for hearing problems, ENT pain, congestion, rhinorrhea, epistaxis, hoarseness  "and dental problems.  Cardiovascular:  Negative for chest pain, palpitations, tachycardia, orthopnea and edema.  Respiratory:  Negative for cough, dyspnea and hemoptysis.  Gastrointestinal:  Negative for abdominal pain, heartburn, constipation, diarrhea and stool changes.  Musculoskeletal:  Negative for arthralgias, back pain and myalgias.  Integumentary/Breast:  See HPI.    Past Medical History:   Diagnosis Date     Seizures (H)        No past surgical history on file.    Family History   Problem Relation Age of Onset     Cerebrovascular Disease Mother      Breast Cancer Mother      Gallbladder Disease Mother      Breast Cancer Father      Cancer Father      Gallbladder Disease Father      Breast Cancer Sister         high risk     Colon Cancer Sister         high risk       Social History     Tobacco Use     Smoking status: Never     Smokeless tobacco: Never   Vaping Use     Vaping status: Not on file   Substance Use Topics     Alcohol use: Yes     Alcohol/week: 2.0 standard drinks of alcohol     Types: 2 Standard drinks or equivalent per week       There is no problem list on file for this patient.    Allergies   Allergen Reactions     Adhesive Tape      Peeled off skin     Current Outpatient Medications   Medication Sig Dispense Refill     escitalopram (LEXAPRO) 10 MG tablet Take 1 tablet by mouth daily       levETIRAcetam (KEPPRA) 750 MG tablet Take 1 tablet (750 mg) by mouth 2 times daily 180 tablet 3     norethindrone-ethinyl estradiol (JUNEL FE 1/20) 1-20 MG-MCG tablet Take 1 tablet by mouth daily       spironolactone (ALDACTONE) 25 MG tablet Take 25 mg by mouth 2 times daily (Patient not taking: Reported on 4/21/2023)       zonisamide (ZONEGRAN) 100 MG capsule Take 2 capsules (200 mg) by mouth At Bedtime 180 capsule 3     Vitals: /64   Pulse 89   Resp 16   Ht 1.753 m (5' 9\")   Wt 54.4 kg (120 lb)   SpO2 100%   BMI 17.72 kg/m    BMI= Body mass index is 17.72 kg/m .    EXAM:  GENERAL: healthy, " alert and no distress   BREAST:  The breasts are small and appear symmetric with no overlying skin changes.  The nipples are normal bilaterally.  There is no dimpling or thickening of the skin.  No mass is appreciated in either breast.  The breast tissue is generally very dense centrally.  There is no axillary or supraclavicular lymphadenopathy.  CARDIOVASCULAR:  RRR  RESPIRATORY: nonlabored breathing  NECK: Neck supple. No adenopathy. Thyroid symmetric, normal size  SKIN: No suspicious lesions or rashes  LYMPH: Normal cervical lymph nodes      ASSESSMENT/PLAN:  Karissa Rivera is a 37-year-old female with increased lifetime risk for breast cancer calculated to be 45% using the IB I-S score calculator.  We reviewed her imaging from the past year which is all benign and her breast exam today is also benign.  I would recommend she continue with high risk screening and ordered a breast MRI as well as a mammogram and she should follow-up with me in 1 year for clinical breast exam.  We discussed if she were to become pregnant we would decide on the best timing of imaging relative to pregnancy.    Vale Ha MD  [facility]  [phone]        Please route or send letter to:  Primary Care Provider (PCP) and Referring Provider

## 2023-04-28 NOTE — LETTER
May 1, 2023          Sandra Ramírez MD  [address]  [place] [address]      RE:   Karissa Rivera 1985      Dear Colleague,    Thank you for referring your patient, Karissa Rivera, to [facility] at [facility]. Please see a copy of my visit note below.    [facility] Follow Up Note     CHIEF COMPLAINT:  Increased lifetime risk for breast cancer     HISTORY OF PRESENT ILLNESS:  Karissa Rivera is a 37 year old female who is seen in follow up for increased lifetime risk for breast cancer.     She has a family history of her mother having breast cancer 1st at 40yo and then again at 43yo. 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 Family history also signficant for maternal grandmother with breast cancer and pancreatic cancer. Karissa had genetic testing and was negative last year. She was previously followed with high risk screening when she was living in [place]. Her last MRI was in 2018. She has had a prior right breast biopsy which was benign in 2017. Her sister is positive for FAP gene mutation.  She does have a personal history of seizures and a diagnosis of epilepsy.     She had a screening mammogram on 7/22/2022 which was benign.  Her breast tissue is extremely dense lowering the sensitivity of mammography.  She had a breast MRI on 1/27/2022.     Today she reports no specific concerns about either breast.  She has not been having any breast pain or nipple discharge.  She is currently engaged and her and her fiancé have discussed the possibility of having children.  She is wondering what imaging would look like for screening if she were to have a pregnancy.     Hormonal history:   menarche 15,  no children (may consider children yet/unsure) premenopausal, 11 years continuous OCP use, no HRT, no fertility treatment.      ASSESSMENT/PLAN:  Karissa Rivera is a 37-year-old female with increased lifetime risk for breast cancer calculated to  be 45% using the IB I-S score calculator.  We reviewed her imaging from the past year which is all benign and her breast exam today is also benign.  I would recommend she continue with high risk screening and ordered a breast MRI as well as a mammogram and she should follow-up with me in 1 year for clinical breast exam.  We discussed if she were to become pregnant we would decide on the best timing of imaging relative to pregnancy.    Again, thank you for allowing me to participate in the care of your patient.      Sincerely,      Vale Ha MD

## 2023-05-10 ENCOUNTER — ANCILLARY PROCEDURE (OUTPATIENT)
Dept: MRI IMAGING | Facility: CLINIC | Age: 38
End: 2023-05-10
Attending: SURGERY
Payer: COMMERCIAL

## 2023-05-10 ENCOUNTER — TELEPHONE (OUTPATIENT)
Dept: SURGERY | Facility: CLINIC | Age: 38
End: 2023-05-10
Payer: COMMERCIAL

## 2023-05-10 DIAGNOSIS — Z12.39 BREAST CANCER SCREENING, HIGH RISK PATIENT: ICD-10-CM

## 2023-05-10 PROCEDURE — 255N000002 HC RX 255 OP 636: Performed by: SURGERY

## 2023-05-10 PROCEDURE — 77049 MRI BREAST C-+ W/CAD BI: CPT

## 2023-05-10 PROCEDURE — A9585 GADOBUTROL INJECTION: HCPCS | Performed by: SURGERY

## 2023-05-10 PROCEDURE — C8908 MRI W/O FOL W/CONT, BREAST,: HCPCS

## 2023-05-10 RX ORDER — GADOBUTROL 604.72 MG/ML
5.5 INJECTION INTRAVENOUS ONCE
Status: COMPLETED | OUTPATIENT
Start: 2023-05-10 | End: 2023-05-10

## 2023-05-10 RX ADMIN — GADOBUTROL 5.5 ML: 604.72 INJECTION INTRAVENOUS at 09:08

## 2023-05-10 NOTE — CONFIDENTIAL NOTE
I called and left a voicemail for Karissa letting her know her MRI looks good. She can call with questions or concerns and should continue with high risk screening. mammo in 6 months.     Vale Ha MD  [facility]  [phone]

## 2023-05-23 ENCOUNTER — APPOINTMENT (OUTPATIENT)
Dept: URBAN - METROPOLITAN AREA CLINIC 259 | Age: 38
Setting detail: DERMATOLOGY
End: 2023-06-05

## 2023-05-23 DIAGNOSIS — L21.8 OTHER SEBORRHEIC DERMATITIS: ICD-10-CM

## 2023-05-23 DIAGNOSIS — L64.8 OTHER ANDROGENIC ALOPECIA: ICD-10-CM

## 2023-05-23 PROCEDURE — OTHER PRESCRIPTION MEDICATION MANAGEMENT: OTHER

## 2023-05-23 PROCEDURE — OTHER PRESCRIPTION: OTHER

## 2023-05-23 PROCEDURE — 99214 OFFICE O/P EST MOD 30 MIN: CPT

## 2023-05-23 PROCEDURE — OTHER VENIPUNCTURE: OTHER

## 2023-05-23 PROCEDURE — OTHER COUNSELING: OTHER

## 2023-05-23 PROCEDURE — OTHER MIPS QUALITY: OTHER

## 2023-05-23 PROCEDURE — OTHER ORDER TESTS: OTHER

## 2023-05-23 PROCEDURE — 36415 COLL VENOUS BLD VENIPUNCTURE: CPT

## 2023-05-23 RX ORDER — SPIRONOLACTONE 25 MG/1
TABLET, FILM COATED ORAL
Qty: 60 | Refills: 0 | Status: ERX | COMMUNITY
Start: 2023-05-23

## 2023-05-23 RX ORDER — KETOCONAZOLE 20 MG/ML
SHAMPOO, SUSPENSION TOPICAL
Qty: 120 | Refills: 3 | Status: ERX | COMMUNITY
Start: 2023-05-23

## 2023-05-23 ASSESSMENT — LOCATION ZONE DERM
LOCATION ZONE: ARM
LOCATION ZONE: SCALP

## 2023-05-23 ASSESSMENT — LOCATION DETAILED DESCRIPTION DERM
LOCATION DETAILED: RIGHT ANTECUBITAL SKIN
LOCATION DETAILED: LEFT MEDIAL FRONTAL SCALP
LOCATION DETAILED: RIGHT MEDIAL FRONTAL SCALP

## 2023-05-23 ASSESSMENT — LOCATION SIMPLE DESCRIPTION DERM
LOCATION SIMPLE: LEFT SCALP
LOCATION SIMPLE: RIGHT SCALP
LOCATION SIMPLE: RIGHT UPPER ARM

## 2023-05-23 NOTE — HPI: HAIR LOSS
Previous Labs: No
How Did The Hair Loss Occur?: gradual in onset
Additional History: Pt states she has experienced hair loss for about 2.5 years. It started around the time her brother passed away. Pt was previously on Spironolactone. She was on it for about a year before she discontinued use. She has noticed more shedding since discontinuing it. She just started Nutrafol a couple weeks ago. She has tried Rogaine in the past but it did not work well for her.

## 2023-05-23 NOTE — PROCEDURE: VENIPUNCTURE
Detail Level: None
Number Of Tubes Drawn: 5
Bill For Individual Tests Below?: no
Venipuncture Paragraph: An alcohol pad was applied to the venipuncture site. Venipuncture was performed using a butterfly needle. Pressure and a bandaid was applied to the site. No complications were noted.

## 2023-05-23 NOTE — PROCEDURE: PRESCRIPTION MEDICATION MANAGEMENT
Detail Level: Zone
Render In Strict Bullet Format?: No
Initiate Treatment: Spironolactone 25mg QD\\nRogaine foam BID
Plan: Discussed PRP with the pt.

## 2023-05-23 NOTE — PROCEDURE: ORDER TESTS
----- Message from Flavia Kc MD sent at 12/4/2019  7:07 PM CST -----  Please notify the patient of the following results.  Hemoglobin A1cs in the desired range.  Continue present medication  
Writer informed patient of lab results   
Bill For Surgical Tray: no
Expected Date Of Service: 05/23/2023
Billing Type: Third-Party Bill

## 2023-06-23 ENCOUNTER — APPOINTMENT (OUTPATIENT)
Dept: URBAN - METROPOLITAN AREA CLINIC 259 | Age: 38
Setting detail: DERMATOLOGY
End: 2023-07-02

## 2023-06-23 ENCOUNTER — APPOINTMENT (OUTPATIENT)
Dept: URBAN - METROPOLITAN AREA CLINIC 259 | Age: 38
Setting detail: DERMATOLOGY
End: 2023-06-23

## 2023-06-23 DIAGNOSIS — L64.8 OTHER ANDROGENIC ALOPECIA: ICD-10-CM

## 2023-06-23 PROCEDURE — OTHER PRESCRIPTION: OTHER

## 2023-06-23 PROCEDURE — OTHER MIPS QUALITY: OTHER

## 2023-06-23 PROCEDURE — OTHER PRESCRIPTION MEDICATION MANAGEMENT: OTHER

## 2023-06-23 PROCEDURE — OTHER COUNSELING: OTHER

## 2023-06-23 PROCEDURE — 99213 OFFICE O/P EST LOW 20 MIN: CPT

## 2023-06-23 RX ORDER — SPIRONOLACTONE 25 MG/1
TABLET, FILM COATED ORAL
Qty: 180 | Refills: 0 | Status: ERX | COMMUNITY
Start: 2023-06-23

## 2023-06-23 ASSESSMENT — LOCATION DETAILED DESCRIPTION DERM: LOCATION DETAILED: MID-FRONTAL SCALP

## 2023-06-23 ASSESSMENT — LOCATION SIMPLE DESCRIPTION DERM: LOCATION SIMPLE: ANTERIOR SCALP

## 2023-06-23 ASSESSMENT — LOCATION ZONE DERM: LOCATION ZONE: SCALP

## 2023-06-23 NOTE — HPI: HAIR LOSS
Additional History: Pt states she has been taking the spironolactone 25mg once daily. She states she is tolerating the medication well. Pt states she has not been using the rogaine because she reports it makes her hair feel sticky and dirty. Pt has been using ketoconazole for her seb dermatitis. Pt would like to discuss potentially increasing her spironolactone dosage.

## 2023-06-23 NOTE — PROCEDURE: PRESCRIPTION MEDICATION MANAGEMENT
Render In Strict Bullet Format?: No
Plan: Discussed PRP with patient if she would like a non-systemic option. Pt can call to schedule if she would like to do PRP.
Detail Level: Zone
Modify Regimen: Spironolactone 25mg QD modified to spironolactone 25mg BID

## 2023-12-31 ENCOUNTER — TELEPHONE (OUTPATIENT)
Dept: NEUROLOGY | Facility: CLINIC | Age: 38
End: 2023-12-31
Payer: COMMERCIAL

## 2023-12-31 DIAGNOSIS — G40.909 NONINTRACTABLE EPILEPSY WITHOUT STATUS EPILEPTICUS, UNSPECIFIED EPILEPSY TYPE (H): ICD-10-CM

## 2023-12-31 RX ORDER — LEVETIRACETAM 750 MG/1
750 TABLET ORAL 2 TIMES DAILY
Qty: 180 TABLET | Refills: 3 | Status: SHIPPED | OUTPATIENT
Start: 2023-12-31 | End: 2024-04-30

## 2024-01-12 ENCOUNTER — HOSPITAL ENCOUNTER (OUTPATIENT)
Dept: MAMMOGRAPHY | Facility: CLINIC | Age: 39
Discharge: HOME | End: 2024-01-12
Attending: SURGERY | Admitting: SURGERY
Payer: COMMERCIAL

## 2024-01-12 DIAGNOSIS — Z12.39 BREAST CANCER SCREENING, HIGH RISK PATIENT: ICD-10-CM

## 2024-01-12 PROCEDURE — 77063 BREAST TOMOSYNTHESIS BI: CPT

## 2024-01-27 NOTE — PROGRESS NOTES
[facility] Follow Up Note    CHIEF COMPLAINT:  Increased lifetime risk of breast cancer    HISTORY OF PRESENT ILLNESS:  Karissa Rivera is a 38 year old female who is seen in follow up for increased lifetime risk for breast cancer.     She has a family history of her mother having breast cancer 1st at 38yo and then again at 41yo. 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 Family history also signficant for maternal grandmother with breast cancer and pancreatic cancer. Karissa had genetic testing and was negative last year. She was previously followed with high risk screening when she was living in [place]. Her last MRI was in 2018. She has had a prior right breast biopsy which was benign in 2017. Her sister is positive for FAP gene mutation.  She does have a personal history of seizures and a diagnosis of epilepsy.    She had a screening MRI on 5/10/2023 which was benign and a mammogram on 1/12/2024 which was also benign.     She reports she has been doing well.  She has not had any breast concerns in the past year.  No new family history.  She is not on any new medications.  She continues to figure skate for exercise and is a  in [place].  She gets at least 30 minutes of exercise most days.  She is limiting alcohol and follows a overall low-fat diet.    Hormonal history:   menarche 15,  no children (may consider children yet/unsure) premenopausal, 11 years continuous OCP use, no HRT, no fertility treatment.      Family history of breast cancer: Yes - as above  Family history of ovarian cancer:  No  Family history of colon cancer: No  Family history of prostate cancer: No      Past Medical History:   Diagnosis Date    Seizures (H)        No past surgical history on file.    Family History   Problem Relation Age of Onset    Cerebrovascular Disease Mother     Breast Cancer Mother     Gallbladder Disease Mother     Breast Cancer Father     Cancer Father      Gallbladder Disease Father     Breast Cancer Sister         high risk    Colon Cancer Sister         high risk       Social History     Tobacco Use    Smoking status: Never    Smokeless tobacco: Never   Substance Use Topics    Alcohol use: Yes     Alcohol/week: 2.0 standard drinks of alcohol     Types: 2 Standard drinks or equivalent per week       There is no problem list on file for this patient.    Allergies   Allergen Reactions    Adhesive Tape      Peeled off skin     Current Outpatient Medications   Medication Sig Dispense Refill    DRYSOL 20 % external solution APPLY TOPICALLY TO AXILLAE ONCE DAILY AS NEEDED      escitalopram (LEXAPRO) 10 MG tablet Take 1 tablet by mouth daily      fluconazole (DIFLUCAN) 150 MG tablet       levETIRAcetam (KEPPRA) 750 MG tablet Take 1 tablet (750 mg) by mouth 2 times daily 180 tablet 3    levETIRAcetam (KEPPRA) 750 MG tablet Take 1 tablet by mouth 2 times daily      MODERNA COVID-19 BIVAL BOOSTER 50 MCG/0.5ML injection       norethindrone-ethinyl estradiol (JUNEL FE 1/20) 1-20 MG-MCG tablet Take 1 tablet by mouth daily      RETIN-A 0.025 % external cream APPLY TOPICALLY TO FACE EVERY NIGHT AT BEDTIME AS TOLERATED      spironolactone (ALDACTONE) 25 MG tablet Take 25 mg by mouth 2 times daily (Patient not taking: Reported on 4/28/2023)      zonisamide (ZONEGRAN) 100 MG capsule Take 2 capsules (200 mg) by mouth At Bedtime 180 capsule 3       EXAM:  GENERAL: healthy, alert and no distress   BREAST: Breasts are symmetric.  Nipples are everted bilaterally and appear normal.  Skin is normal.  There are no masses palpable in either breast.  Tissue is extremely dense.  There is no axillary or supraclavicular lymphadenopathy.  CARDIOVASCULAR:  RRR  RESPIRATORY: nonlabored breathing  NECK: Neck supple. No adenopathy. Thyroid symmetric, normal size  SKIN: No suspicious lesions or rashes  LYMPH: Normal cervical lymph nodes      ASSESSMENT/PLAN:  Karissa Rivera is a 38-year-old female with  increased lifetime risk for breast cancer who I follow for high risk screening.  Her clinical exam is benign.  I would recommend a screening MRI in May and a mammogram 6 months later.  We reviewed her mammogram from earlier in January which is benign.  She is comfortable with our plan and would like to schedule her MRI for May and then she will see me again this time next year for a clinical breast exam.          Vale Ha MD  [facility]  [phone]        Please route or send letter to:  Primary Care Provider (PCP) and Referring Provider

## 2024-01-30 ENCOUNTER — OFFICE VISIT (OUTPATIENT)
Dept: SURGERY | Facility: CLINIC | Age: 39
End: 2024-01-30
Payer: COMMERCIAL

## 2024-01-30 VITALS
RESPIRATION RATE: 16 BRPM | WEIGHT: 120 LBS | BODY MASS INDEX: 17.77 KG/M2 | HEART RATE: 100 BPM | SYSTOLIC BLOOD PRESSURE: 110 MMHG | HEIGHT: 69 IN | DIASTOLIC BLOOD PRESSURE: 60 MMHG | OXYGEN SATURATION: 95 %

## 2024-01-30 DIAGNOSIS — Z91.89 AT HIGH RISK FOR BREAST CANCER: Primary | ICD-10-CM

## 2024-01-30 PROCEDURE — 99212 OFFICE O/P EST SF 10 MIN: CPT | Performed by: SURGERY

## 2024-01-30 NOTE — LETTER
January 30, 2024          Sandra Ramírez MD  [address] [address]      RE:   Karissa Rivera 1985      Dear Colleague,    Thank you for referring your patient, Karissa Rivera, to [facility] at [facility]. Please see a copy of my visit note below.      CHIEF COMPLAINT:  Increased lifetime risk of breast cancer     HISTORY OF PRESENT ILLNESS:  Karissa Rivera is a 38 year old female who is seen in follow up for increased lifetime risk for breast cancer.     She has a family history of her mother having breast cancer 1st at 40yo and then again at 41yo. 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 Family history also signficant for maternal grandmother with breast cancer and pancreatic cancer. Karissa had genetic testing and was negative last year. She was previously followed with high risk screening when she was living in [place]. Her last MRI was in 2018. She has had a prior right breast biopsy which was benign in 2017. Her sister is positive for FAP gene mutation.  She does have a personal history of seizures and a diagnosis of epilepsy.     She had a screening MRI on 5/10/2023 which was benign and a mammogram on 1/12/2024 which was also benign.      She reports she has been doing well.  She has not had any breast concerns in the past year.  No new family history.  She is not on any new medications.  She continues to figure skate for exercise and is a  in [place].  She gets at least 30 minutes of exercise most days.  She is limiting alcohol and follows a overall low-fat diet.      ASSESSMENT/PLAN:  Karissa Rivera is a 38-year-old female with increased lifetime risk for breast cancer who I follow for high risk screening.  Her clinical exam is benign.  I would recommend a screening MRI in May and a mammogram 6 months later.  We reviewed her mammogram from earlier in January which is benign.  She is comfortable with our plan and would like to  schedule her MRI for May and then she will see me again this time next year for a clinical breast exam.    Again, thank you for allowing me to participate in the care of your patient.      Sincerely,      Vale Ha MD

## 2024-02-12 DIAGNOSIS — G40.909 NONINTRACTABLE EPILEPSY WITHOUT STATUS EPILEPTICUS, UNSPECIFIED EPILEPSY TYPE (H): ICD-10-CM

## 2024-02-12 RX ORDER — ZONISAMIDE 100 MG/1
200 CAPSULE ORAL AT BEDTIME
Qty: 120 CAPSULE | Refills: 0 | Status: SHIPPED | OUTPATIENT
Start: 2024-02-12 | End: 2024-04-30

## 2024-02-12 NOTE — TELEPHONE ENCOUNTER
zonisamide (ZONEGRAN) 100 MG capsule   180 capsule 3 12/6/2022     Last Office Visit : 4-  Future Office visit:  4-    Anti-Seizure Meds Protocol  [id number]/12/2024 10:46 AM   Protocol Details Review Authorizing provider's last note.    Normal CBC on file in past 26 months    Normal ALT or AST on file in past 26 months    Normal platelet count on file in past 26 months     Filling per [facility] medication refill protocols - seizure medications.  Not all labs required.      Plan :       Continue levetiracetam 750 mg twice a day   Continue zonisamide 200 mg pm   Check levels   For pregnancy - we may proceed with zonisamide and levetiracetam.      Christal Murray MD   Epilepsy Staff

## 2024-04-03 RX ORDER — MINOXIDIL 2.5 MG/1
1.25 TABLET ORAL DAILY
Qty: 15 TABLET | OUTPATIENT
Start: 2024-04-03

## 2024-04-30 ENCOUNTER — VIRTUAL VISIT (OUTPATIENT)
Dept: NEUROLOGY | Facility: CLINIC | Age: 39
End: 2024-04-30
Payer: COMMERCIAL

## 2024-04-30 VITALS — HEIGHT: 69 IN | BODY MASS INDEX: 17.77 KG/M2 | WEIGHT: 120 LBS

## 2024-04-30 DIAGNOSIS — G40.909 NONINTRACTABLE EPILEPSY WITHOUT STATUS EPILEPTICUS, UNSPECIFIED EPILEPSY TYPE (H): ICD-10-CM

## 2024-04-30 RX ORDER — ZONISAMIDE 100 MG/1
200 CAPSULE ORAL AT BEDTIME
Qty: 180 CAPSULE | Refills: 3 | Status: SHIPPED | OUTPATIENT
Start: 2024-04-30

## 2024-04-30 RX ORDER — LEVETIRACETAM 750 MG/1
750 TABLET ORAL 2 TIMES DAILY
Qty: 180 TABLET | Refills: 3 | Status: SHIPPED | OUTPATIENT
Start: 2024-04-30

## 2024-04-30 ASSESSMENT — PAIN SCALES - GENERAL: PAINLEVEL: NO PAIN (0)

## 2024-04-30 NOTE — LETTER
"2024       RE: Karissa Rivera  : 1985   MRN: [id number]        Dear Colleague,    [facility] you [facility] your patient, Karissa Rivera[facility]. Please see a copy of my visit note below.      [facility] Progress Note    Patient:  Karissa Rivera  :  1985   Age:  38 year old   Today's Office Visit: 2024      Epilepsy Data:   Karissa Rivera is 37 year old right handed who presents with history of epilepsy. She is transferring care from [facility] (she lost confidence in provider regarding family planning and lack of medical knowledge). Seizure started at age elementary school, early on seizure consisted of \"head shaking at the sun\", early on she took depakote. She transitioned to lamotrigine because depakote impacted learning, she took lamotrigine and depakote. After puberty she generalized tonic-clonic convulsion. She recall one twitch and she dropped coffee mug age 11. Her seizure stopped after started zonisamide since . She has no cognitive slowing or kidney stones on zonisamide. She has \"runny nose and drowsiness from zonisamide\". She has fatigue which may also be secondary to levetiracetam. No mood issues.     Interval History:   No seizures since . Since last visit patient has not had any seizures and is adherent to seizure medications as recommended. Patient denies any convulsions or spells concerning for seizure activity.  I encouraged use of pill box, phone alarm, and routine reminders to increased seizure medication adherence. On current seizure medications, patient denies experiencing dizziness, no double vision, no nausea, no vomiting, no abdominal pain, no mood changes. Since our last visit patient had no ER visits, no hospitalizations, and had no significant fall with trauma.     We reviewed seizure plan of care and addressed all question in regards to patient care. " "Patient/caregiver are agreeable to plan of care.   On this visit we reviewed the importance of self care (sleep, stress management, caffeine intake, adherence to seizure medications, avoid excessive alcohol use, and drug use) and this impact on seizures.      Seizure type 1: In elementary school she had episodes in which she \"looked at sun and shook her head\". I suspect these might have been absence, nonetheless, they stopped with antiepileptic drug.   Seizure type 2: generalized tonic-clonic convulsion, these started in puberty and stopped after starting zonisamide in 2007. The ketogenic also helped her convulsions. Last generalized tonic-clonic convulsion was 2007.   Antiepileptic drug:   Zonisamide(100 mg capsule) 100 mg at night   Levetiracetam (750 mg tablet) 750 mg twice a day     Current Outpatient Medications   Medication Sig Dispense Refill    DRYSOL 20 % external solution APPLY TOPICALLY TO AXILLAE ONCE DAILY AS NEEDED      levETIRAcetam (KEPPRA) 750 MG tablet Take 1 tablet (750 mg) by mouth 2 times daily 180 tablet 3    norethindrone-ethinyl estradiol (JUNEL FE 1/20) 1-20 MG-MCG tablet Take 1 tablet by mouth daily      RETIN-A 0.025 % external cream APPLY TOPICALLY TO FACE EVERY NIGHT AT BEDTIME AS TOLERATED      spironolactone (ALDACTONE) 25 MG tablet Take 25 mg by mouth 2 times daily      zonisamide (ZONEGRAN) 100 MG capsule Take 2 capsules (200 mg) by mouth at bedtime 120 capsule 0    escitalopram (LEXAPRO) 10 MG tablet Take 1 tablet by mouth daily      fluconazole (DIFLUCAN) 150 MG tablet       levETIRAcetam (KEPPRA) 750 MG tablet Take 1 tablet by mouth 2 times daily      MODERNA COVID-19 BIVAL BOOSTER 50 MCG/0.5ML injection        Perceived AED Side Effects: Yes  Medication Notes:   AED Medication Compliance:  compliant most of the time  Using a pill box:  Yes  Past AEDs:    Depakote - caused learning issues   Lamotrigine - no side effects   Levetiracetam - 750 mg twice a day currently, in high " "school they tired to increase levetiracetam and she had side effects.   Psycho-Social History: Karissa Rivera currently lives with finances, she got engaged, she is in school for some master level classes and plans to go into marriage and family counseling. 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 Born in [place] and moved to [place] age 9, then moved to [place] age [age] and went to high school, then moved to [place]. Moved back to [place] 2020. Competitive ice skating national.   Currently, patient denies feeling depressed, denies feeling anhedonia, denies suicidal  thoughts, and denies having feelings of excessive guilt/worthlessness.  Does not smoke, rare alcohol use, no recreational drug use. We reviewed importance of mental and emotional wellbeing and impact on health.   Driving:  Currently patient is:  Driving: Patient was made aware of state driving laws. Currently meets criteria to continue to drive.  Previous Evaluations for Epilepsy:   EEG: none on file, we will have to get from [facility]   MRI of Brain: this was completed in 2000 in [place]  Exam:    Ht 5' 9\" (175.3 cm)   Wt 120 lb (54.4 kg)   BMI 17.72 kg/m       Wt Readings from Last 5 Encounters:   04/30/24 120 lb (54.4 kg)   01/30/24 120 lb (54.4 kg)   04/28/23 120 lb (54.4 kg)   04/21/23 120 lb (54.4 kg)   12/06/22 120 lb (54.4 kg)       Alert, orientated, speech is fluent      Impression:    Generalized Epilepsy     Discussion:   37-year-old female presents with generalized epilepsy based on clinical presentation.  She states her seizure stopped when she started taking zonisamide. Per Dr. Vanessa Tomas note EEG was notable generalized epileptiform discharges.     She would like to plan for her future pregnancy.  We spent most of our time reviewing seizure medications and teratogenicity.  She is not wanting to take lamotrigine and feels it was not helpful for her in the past.  I reviewed the existing " literature on zonisamide in the pregnancy registries that had around 112 patients on this medication with less than 5% risk of teratogenicity.  Since Zonegran is an important seizure medication for her I encouraged her to continue it with levetiracetam.  I encouraged her to pursue her family planning goals based on existing knowledge of zonisamide teratogenicity.      Plan :      Continue levetiracetam (750 mg tablet) 750 mg twice a day   Continue zonisamide (100 mg capsule) 200 mg pm   Check levels   For pregnancy - we may proceed with zonisamide and levetiracetam. She is not planning pregnancy.           Again, thank you for allowing me to participate in the care of your patient.      Sincerely,    Christal Murray MD

## 2024-04-30 NOTE — NURSING NOTE
Is the patient currently in the state of [place]? YES    Visit mode:VIDEO    If the visit is dropped, the patient can be reconnected by: VIDEO VISIT: Send to e-mail at: [email or web address]    Will anyone else be joining the visit? NO  (If patient encounters technical issues they should call [phone])    How would you like to obtain your AVS? Mail a copy    Are changes needed to the allergy or medication list? No    Are refills needed on medications prescribed by this physician? YES    Reason for visit: KEYLA CHAVIRA

## 2024-04-30 NOTE — PROGRESS NOTES
"Virtual Visit Details    Type of service:  Video Visit   Video Start Time: 12:38 PM  Video End Time: 12:55 pm    Originating Location (pt. Location): Home    Distant Location (provider location):  Off-site  Platform used for Video Visit: [facility] Epilepsy Care Progress Note    Patient:  Karissa Rivera  :  1985   Age:  38 year old   Today's Office Visit: 2024      Epilepsy Data:   Karissa Rivera is 37 year old right handed who presents with history of epilepsy. She is transferring care from [facility] (she lost confidence in provider regarding family planning and lack of medical knowledge). Seizure started at age elementary school, early on seizure consisted of \"head shaking at the sun\", early on she took depakote. She transitioned to lamotrigine because depakote impacted learning, she took lamotrigine and depakote. After puberty she generalized tonic-clonic convulsion. She recall one twitch and she dropped coffee mug age 11. Her seizure stopped after started zonisamide since . She has no cognitive slowing or kidney stones on zonisamide. She has \"runny nose and drowsiness from zonisamide\". She has fatigue which may also be secondary to levetiracetam. No mood issues.     Interval History:   No seizures since . Since last visit patient has not had any seizures and is adherent to seizure medications as recommended. Patient denies any convulsions or spells concerning for seizure activity.  I encouraged use of pill box, phone alarm, and routine reminders to increased seizure medication adherence. On current seizure medications, patient denies experiencing dizziness, no double vision, no nausea, no vomiting, no abdominal pain, no mood changes. Since our last visit patient had no ER visits, no hospitalizations, and had no significant fall with trauma.     We reviewed seizure plan of care and addressed all question in regards to patient care. Patient/caregiver are agreeable to plan of care.   On " "this visit we reviewed the importance of self care (sleep, stress management, caffeine intake, adherence to seizure medications, avoid excessive alcohol use, and drug use) and this impact on seizures.      Seizure type 1: In elementary school she had episodes in which she \"looked at sun and shook her head\". I suspect these might have been absence, nonetheless, they stopped with antiepileptic drug.   Seizure type 2: generalized tonic-clonic convulsion, these started in puberty and stopped after starting zonisamide in 2007. The ketogenic also helped her convulsions. Last generalized tonic-clonic convulsion was 2007.   Antiepileptic drug:   Zonisamide(100 mg capsule) 100 mg at night   Levetiracetam (750 mg tablet) 750 mg twice a day     Current Outpatient Medications   Medication Sig Dispense Refill    DRYSOL 20 % external solution APPLY TOPICALLY TO AXILLAE ONCE DAILY AS NEEDED      levETIRAcetam (KEPPRA) 750 MG tablet Take 1 tablet (750 mg) by mouth 2 times daily 180 tablet 3    norethindrone-ethinyl estradiol (JUNEL FE 1/20) 1-20 MG-MCG tablet Take 1 tablet by mouth daily      RETIN-A 0.025 % external cream APPLY TOPICALLY TO FACE EVERY NIGHT AT BEDTIME AS TOLERATED      spironolactone (ALDACTONE) 25 MG tablet Take 25 mg by mouth 2 times daily      zonisamide (ZONEGRAN) 100 MG capsule Take 2 capsules (200 mg) by mouth at bedtime 120 capsule 0    escitalopram (LEXAPRO) 10 MG tablet Take 1 tablet by mouth daily      fluconazole (DIFLUCAN) 150 MG tablet       levETIRAcetam (KEPPRA) 750 MG tablet Take 1 tablet by mouth 2 times daily      MODERNA COVID-19 BIVAL BOOSTER 50 MCG/0.5ML injection        Perceived AED Side Effects: Yes  Medication Notes:   AED Medication Compliance:  compliant most of the time  Using a pill box:  Yes  Past AEDs:    Depakote - caused learning issues   Lamotrigine - no side effects   Levetiracetam - 750 mg twice a day currently, in high school they tired to increase levetiracetam and she had " "side effects.   Psycho-Social History: Karissa Rivera currently lives with finances, she got engaged, she is in school for some master level classes and plans to go into marriage and family counseling. 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 Born in [place] and moved to [place] age 9, then moved to [place] age [age] and went to high school, then moved to [place]. Moved back to [place] 2020. Competitive ice skating national.   Currently, patient denies feeling depressed, denies feeling anhedonia, denies suicidal  thoughts, and denies having feelings of excessive guilt/worthlessness.  Does not smoke, rare alcohol use, no recreational drug use. We reviewed importance of mental and emotional wellbeing and impact on health.   Driving:  Currently patient is:  Driving: Patient was made aware of state driving laws. Currently meets criteria to continue to drive.  Previous Evaluations for Epilepsy:   EEG: none on file, we will have to get from [facility] data   MRI of Brain: this was completed in 2000 in [place]  Exam:    Ht 5' 9\" (175.3 cm)   Wt 120 lb (54.4 kg)   BMI 17.72 kg/m       Wt Readings from Last 5 Encounters:   04/30/24 120 lb (54.4 kg)   01/30/24 120 lb (54.4 kg)   04/28/23 120 lb (54.4 kg)   04/21/23 120 lb (54.4 kg)   12/06/22 120 lb (54.4 kg)       Alert, orientated, speech is fluent      Impression:    Generalized Epilepsy     Discussion:   37-year-old female presents with generalized epilepsy based on clinical presentation.  She states her seizure stopped when she started taking zonisamide. Per Dr. Vanessa Tomas note EEG was notable generalized epileptiform discharges.     She would like to plan for her future pregnancy.  We spent most of our time reviewing seizure medications and teratogenicity.  She is not wanting to take lamotrigine and feels it was not helpful for her in the past.  I reviewed the existing literature on zonisamide in the pregnancy registries that had " around 112 patients on this medication with less than 5% risk of teratogenicity.  Since Zonegran is an important seizure medication for her I encouraged her to continue it with levetiracetam.  I encouraged her to pursue her family planning goals based on existing knowledge of zonisamide teratogenicity.      Plan :      Continue levetiracetam (750 mg tablet) 750 mg twice a day   Continue zonisamide (100 mg capsule) 200 mg pm   Check levels   For pregnancy - we may proceed with zonisamide and levetiracetam. She is not planning pregnancy.     Christal Murray MD   Epilepsy Staff

## 2024-05-21 ENCOUNTER — HOSPITAL ENCOUNTER (OUTPATIENT)
Dept: MRI IMAGING | Facility: CLINIC | Age: 39
Discharge: HOME | End: 2024-05-21
Attending: SURGERY | Admitting: SURGERY
Payer: COMMERCIAL

## 2024-05-21 DIAGNOSIS — Z91.89 AT HIGH RISK FOR BREAST CANCER: ICD-10-CM

## 2024-05-21 PROCEDURE — A9585 GADOBUTROL INJECTION: HCPCS | Performed by: SURGERY

## 2024-05-21 PROCEDURE — 77049 MRI BREAST C-+ W/CAD BI: CPT

## 2024-05-21 PROCEDURE — 255N000002 HC RX 255 OP 636: Performed by: SURGERY

## 2024-05-21 RX ORDER — GADOBUTROL 604.72 MG/ML
5.5 INJECTION INTRAVENOUS ONCE
Status: COMPLETED | OUTPATIENT
Start: 2024-05-21 | End: 2024-05-21

## 2024-05-21 RX ADMIN — GADOBUTROL 5.5 ML: 604.72 INJECTION INTRAVENOUS at 08:46

## 2024-05-22 ENCOUNTER — TELEPHONE (OUTPATIENT)
Dept: MAMMOGRAPHY | Facility: CLINIC | Age: 39
End: 2024-05-22
Payer: COMMERCIAL

## 2024-05-22 DIAGNOSIS — Z91.89 AT HIGH RISK FOR BREAST CANCER: Primary | ICD-10-CM

## 2024-05-22 NOTE — TELEPHONE ENCOUNTER
Dr. Ha reviewed Karissa's breast MRI from 5/21/24. Dr. Ha recommended a screening mammogram in 6 months and a clinic visit with her after. Orders placed for Dr. Ha to sign off on.     EFFIE Cline will notify patient of Dr. Ha's recommendations when Karissa returns her call.     Monica Hinton RN, BSN, PHN  Breast Care Nurse Coordinator  [facility]- [facility] [place]  [phone]

## 2024-05-30 RX ORDER — TRETINOIN 0.025 %
CREAM (GRAM) TOPICAL
Qty: 40 G | OUTPATIENT
Start: 2024-05-30

## 2024-06-14 RX ORDER — TRETINOIN 0.25 MG/G
CREAM TOPICAL
Qty: 40 G | OUTPATIENT
Start: 2024-06-14

## 2024-06-18 RX ORDER — TRETINOIN 0.25 MG/G
CREAM TOPICAL
Qty: 40 G | OUTPATIENT
Start: 2024-06-18

## 2024-06-18 NOTE — TELEPHONE ENCOUNTER
Refused 4 days ago (6/14/2024):   Request already responded to by other means (phone, fax, etc.) (Provider not at [facility]/ patient not seen@ [facility]) )   tretinoin (RETIN-A) 0.025 % external cream   Sig: APPLY TOPICALLY TO FACE EVERY NIGHT AT BEDTIME AS TOLERATED   Disp: 40 g    Refills:   Received from: Pharmacy       RETIN-A 0.025 % external cream -- -- 3/14/2023 -- Yes   Sig: APPLY TOPICALLY TO FACE EVERY NIGHT AT BEDTIME AS TOLERATED   Class: Historical   Order: [id number]

## 2025-01-13 ENCOUNTER — HOSPITAL ENCOUNTER (OUTPATIENT)
Dept: MAMMOGRAPHY | Facility: CLINIC | Age: 40
Discharge: HOME | End: 2025-01-13
Attending: SURGERY | Admitting: SURGERY
Payer: COMMERCIAL

## 2025-01-13 DIAGNOSIS — Z91.89 AT HIGH RISK FOR BREAST CANCER: ICD-10-CM

## 2025-01-13 PROCEDURE — 77063 BREAST TOMOSYNTHESIS BI: CPT

## 2025-01-20 NOTE — PROGRESS NOTES
[facility] Follow Up Note    CHIEF COMPLAINT:  High risk breast cancer    HISTORY OF PRESENT ILLNESS:  Karissa Rivera is a 39 year old female who is seen in follow up for high risk breast cancer.    She has a family history of her mother having breast cancer 1st at 38yo and then again at 43yo. 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 Family history also signficant for maternal grandmother with breast cancer and pancreatic cancer. Karissa had genetic testing and was negative last year. She was previously followed with high risk screening when she was living in [place].  She has had a prior right breast biopsy which was benign in 2017. Her sister is positive for FAP gene mutation.  She does have a personal history of seizures and a diagnosis of epilepsy.     She had a screening MRI on 5/21/2024 which was benign.  She had a screening mammogram on 1/13/2025 which was benign.     She reports no new breast concerns.  She denies any significant breast pain.  No nipple discharge.  No areas of concern on self-exam.     Hormonal history:   menarche 15,  no children (may consider children yet/unsure) premenopausal, 11 years continuous OCP use, no HRT, no fertility treatment.      Family history of breast cancer: Yes - as above  Family history of ovarian cancer:  No  Family history of colon cancer: No  Family history of prostate cancer: No    Past Medical History:   Diagnosis Date    Seizures (H)        No past surgical history on file.    Family History   Problem Relation Age of Onset    Cerebrovascular Disease Mother     Breast Cancer Mother     Gallbladder Disease Mother     Breast Cancer Father     Cancer Father     Gallbladder Disease Father     Breast Cancer Sister         high risk    Colon Cancer Sister         high risk       Social History     Tobacco Use    Smoking status: Never     Passive exposure: Past    Smokeless tobacco: Never   Substance Use Topics    Alcohol use:  Yes     Alcohol/week: 2.0 standard drinks of alcohol     Types: 2 Standard drinks or equivalent per week       There is no problem list on file for this patient.    Allergies   Allergen Reactions    Adhesive Tape      Peeled off skin     Current Outpatient Medications   Medication Sig Dispense Refill    DRYSOL 20 % external solution APPLY TOPICALLY TO AXILLAE ONCE DAILY AS NEEDED      escitalopram (LEXAPRO) 10 MG tablet Take 1 tablet by mouth daily      fluconazole (DIFLUCAN) 150 MG tablet       levETIRAcetam (KEPPRA) 750 MG tablet Take 1 tablet (750 mg) by mouth 2 times daily 180 tablet 3    levETIRAcetam (KEPPRA) 750 MG tablet Take 1 tablet by mouth 2 times daily      MODERNA COVID-19 BIVAL BOOSTER 50 MCG/0.5ML injection       norethindrone-ethinyl estradiol (JUNEL FE 1/20) 1-20 MG-MCG tablet Take 1 tablet by mouth daily      RETIN-A 0.025 % external cream APPLY TOPICALLY TO FACE EVERY NIGHT AT BEDTIME AS TOLERATED      spironolactone (ALDACTONE) 25 MG tablet Take 25 mg by mouth 2 times daily      zonisamide (ZONEGRAN) 100 MG capsule Take 2 capsules (200 mg) by mouth at bedtime 180 capsule 3     Vitals: There were no vitals taken for this visit.  BMI= There is no height or weight on file to calculate BMI.    EXAM:  GENERAL: healthy, alert and no distress   BREAST: Breasts are symmetric.  Skin is normal.  Nipples are everted and appear normal bilaterally.  On the right breast at 10:00, 2 cm from nipple there is a firm nodule that is approximately 6 mm in size.  There are no other areas of concern on bilateral breast exam.  Tissue is extremely dense bilaterally.  There is no axillary or supraclavicular lymphadenopathy.  CARDIOVASCULAR:  RRR  RESPIRATORY: nonlabored breathing  NECK: Neck supple. No adenopathy. Thyroid symmetric, normal size  SKIN: No suspicious lesions or rashes  LYMPH: Normal cervical lymph nodes      ASSESSMENT/PLAN:  Karissa Rivera who is high risk for breast cancer. We reviewed her mammogram and  MRI which are benign. On her clinical exam there is a nodule on the right breast at 10:00, 2cm FN that is new from prior. I would recommend an US for further evaluation. She is in agreement. Plan for MRI in May if US is benign. Follow up mammogram January 2026 and clinic visit with me after imaging.         10 minutes total time spent on the date of this encounter doing: chart review, review of test results, patient visit, physical exam, education, counseling, developing plan of care, and documenting.      Vale Ha MD  [facility]  [phone]        Please route or send letter to:  Primary Care Provider (PCP) and Referring Provider

## 2025-01-21 ENCOUNTER — OFFICE VISIT (OUTPATIENT)
Dept: SURGERY | Facility: CLINIC | Age: 40
End: 2025-01-21
Payer: COMMERCIAL

## 2025-01-21 VITALS
DIASTOLIC BLOOD PRESSURE: 72 MMHG | HEART RATE: 90 BPM | SYSTOLIC BLOOD PRESSURE: 98 MMHG | OXYGEN SATURATION: 100 % | WEIGHT: 120 LBS | HEIGHT: 69 IN | BODY MASS INDEX: 17.77 KG/M2

## 2025-01-21 DIAGNOSIS — R92.343 EXTREMELY DENSE TISSUE OF BOTH BREASTS ON MAMMOGRAPHY: ICD-10-CM

## 2025-01-21 DIAGNOSIS — Z12.31 VISIT FOR SCREENING MAMMOGRAM: ICD-10-CM

## 2025-01-21 DIAGNOSIS — N63.11 MASS OF UPPER OUTER QUADRANT OF RIGHT BREAST: ICD-10-CM

## 2025-01-21 DIAGNOSIS — Z91.89 AT HIGH RISK FOR BREAST CANCER: Primary | ICD-10-CM

## 2025-01-21 PROCEDURE — 99214 OFFICE O/P EST MOD 30 MIN: CPT | Performed by: SURGERY

## 2025-01-21 NOTE — LETTER
January 23, 2025          Sandra Ramírez MD  [address] [address]      RE:   Karissa Rivera 1985    Dear Colleague,    Thank you for referring your patient, Karissa Rivera, to [facility]. Please see a copy of my visit note below.     Karissa Rivera is a 39 year old female who is seen in follow up for high risk breast cancer.     She has a family history of her mother having breast cancer 1st at 40yo and then again at 43yo. 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 Family history also signficant for maternal grandmother with breast cancer and pancreatic cancer. Karissa had genetic testing and was negative last year. She was previously followed with high risk screening when she was living in [place].  She has had a prior right breast biopsy which was benign in 2017. Her sister is positive for FAP gene mutation.  She does have a personal history of seizures and a diagnosis of epilepsy.     She had a screening MRI on 5/21/2024 which was benign.  She had a screening mammogram on 1/13/2025 which was benign.     She reports no new breast concerns.  She denies any significant breast pain.  No nipple discharge.  No areas of concern on self-exam.     ASSESSMENT/PLAN:  Karissa Rivera who is high risk for breast cancer. We reviewed her mammogram and MRI which are benign. On her clinical exam there is a nodule on the right breast at 10:00, 2cm FN that is new from prior. I would recommend an US for further evaluation. She is in agreement. Plan for MRI in May if US is benign. Follow up mammogram January 2026 and clinic visit with me after imaging.      Again, thank you for allowing me to participate in the care of your patient.      Sincerely,      Vale Ha MD

## 2025-01-24 ENCOUNTER — HOSPITAL ENCOUNTER (OUTPATIENT)
Dept: ULTRASOUND IMAGING | Facility: CLINIC | Age: 40
Discharge: HOME | End: 2025-01-24
Attending: SURGERY | Admitting: SURGERY
Payer: COMMERCIAL

## 2025-01-24 DIAGNOSIS — N63.11 MASS OF UPPER OUTER QUADRANT OF RIGHT BREAST: ICD-10-CM

## 2025-01-24 PROCEDURE — 76642 ULTRASOUND BREAST LIMITED: CPT | Mod: RT

## 2025-03-25 ENCOUNTER — OFFICE VISIT (OUTPATIENT)
Dept: NEUROLOGY | Facility: CLINIC | Age: 40
End: 2025-03-25
Payer: COMMERCIAL

## 2025-03-25 VITALS
HEART RATE: 105 BPM | TEMPERATURE: 98.2 F | SYSTOLIC BLOOD PRESSURE: 107 MMHG | DIASTOLIC BLOOD PRESSURE: 74 MMHG | RESPIRATION RATE: 18 BRPM | WEIGHT: 126.8 LBS | OXYGEN SATURATION: 100 % | BODY MASS INDEX: 18.73 KG/M2

## 2025-03-25 DIAGNOSIS — G40.909 NONINTRACTABLE EPILEPSY WITHOUT STATUS EPILEPTICUS, UNSPECIFIED EPILEPSY TYPE (H): ICD-10-CM

## 2025-03-25 DIAGNOSIS — G43.109 MIGRAINE WITH AURA AND WITHOUT STATUS MIGRAINOSUS, NOT INTRACTABLE: Primary | ICD-10-CM

## 2025-03-25 RX ORDER — ZONISAMIDE 100 MG/1
200 CAPSULE ORAL AT BEDTIME
Qty: 180 CAPSULE | Refills: 3 | Status: SHIPPED | OUTPATIENT
Start: 2025-03-25

## 2025-03-25 RX ORDER — CYCLOBENZAPRINE HCL 5 MG
1 TABLET ORAL
COMMUNITY
Start: 2025-03-11

## 2025-03-25 RX ORDER — LEVETIRACETAM 750 MG/1
750 TABLET ORAL 2 TIMES DAILY
Qty: 186 TABLET | Refills: 3 | Status: SHIPPED | OUTPATIENT
Start: 2025-03-25

## 2025-03-25 RX ORDER — SUMATRIPTAN 50 MG/1
50 TABLET, FILM COATED ORAL
Qty: 12 TABLET | Refills: 2 | Status: SHIPPED | OUTPATIENT
Start: 2025-03-25

## 2025-03-25 RX ORDER — ONDANSETRON 4 MG/1
4 TABLET, ORALLY DISINTEGRATING ORAL EVERY 8 HOURS PRN
Qty: 8 TABLET | Refills: 3 | Status: SHIPPED | OUTPATIENT
Start: 2025-03-25

## 2025-03-25 RX ORDER — MINOXIDIL 2.5 MG/1
2.5 TABLET ORAL DAILY
COMMUNITY
Start: 2025-03-04

## 2025-03-25 ASSESSMENT — PAIN SCALES - GENERAL: PAINLEVEL_OUTOF10: MILD PAIN (3)

## 2025-03-25 NOTE — PROGRESS NOTES
"NEW EPILEPSY EVALUATION    DATE OF VISIT: 3/25/2025  NAME: Karissa Rivera  MRN: [id number]    HISTORY    EPILEPSY HISTORY:   39-year-old right-handed woman presents for further treatment of her seizures and comorbidities.  She had previously been treated by Dr. Margret Murray.    Reports onset in elementary school.  Reports a compulsion to look at the bright sun and \"shake her head back-and-forth\".  It is not clear whether involuntary movements occurred during this period of time.  It is not clear whether she had impairment, she believes not.  Infected is not clear with her absences definitely occurred but she was treated with divalproex.  Bilateral tonic-clonic seizures apparently occurred following menarche.  Lamotrigine was added to divalproex without control.  She was placed on the ketogenic diet and seizure control improved significantly.  Divalproex was discontinued and low-dose lamotrigine was maintained.  Ketogenic diet was terminated and the bilateral tonic-clonic seizures occurred.  Zonisamide was initiated in 2007 (22 years old) and seizures came under control.  She firmly believes that zonisamide is essential for seizure control and does not want to discontinue this medication.  Further describes his seizures as follows:    SPELL TYPE 1: Spells in which she sought out self-induced photic stimulation.  Reports \"I could not help myself\".  Not clear whether impairment or movements occurred during these or not.  She has not had these since early childhood.    SPELL TYPE 2: Bilateral tonic-clonic seizures.  Reports that she experienced an aura.  However, she cannot describe this and in fact got rather upset when we tried to get her to recall the details.  She denied serial labs ounces or jerks.  These last occurred at around age 22.    RISK FACTORS FOR EPILEPSY: Best as we can tell, early development was normal.  No febrile convulsions.  No brain infections, brain tumors, significant head trauma.    PREVIOUS " EVALUATION FOR EPILEPSY: Video EEG 3/27/2023 [facility] showed mild excessive diffuse theta but no epileptiform discharges.  We do not have original reports regarding previous evaluations.  Secondhand references embedded in Dr. Antoine Mcwilliams's note (5/1/2015 [facility]) report normal MRI and EEG with generalized spike-wave during previous evaluations.    CURRENT ANTISEIZURE MEDICATIONS: Previously treated with lamotrigine which was helpful but did not control seizures.  Higher doses of levetiracetam resulted in sedation.  Zonisamide resulted in seizure control.  She denies treatment with other antiseizure medications.    SIGNIFICANT OTHER MEDICATIONS: Spironolactone.    PREVIOUS ANTISEIZURE MEDICATIONS: See above.    SIGNIFICANT PAST MEDICAL HISTORY: Denies hypertension, diabetes, lung, liver, kidney, heart disease.    FAMILY HISTORY: Reports that an aunt had recurrent spells of collapse.  Patient is not clear whether these represented syncope or seizures.  No clear history of seizures.    PSYCHOSOCIAL HISTORY: Born in [place].  Moved multiple times during her childhood associated with her father's work.  Reports stable early family life.  Denies sexual abuse.  Graduated from college.  Pursuing a masters degree in psychology specializing in marital therapy.  She is a  who competes and coaches.  .  No children.  She denies spells of depression, anhedonia, suicidal ideation, anxiety, panic attacks.  She does report significant anxiety when she thinks about her seizures and especially about discontinuing zonisamide.    REVIEW OF SYSTEMS: Denies chest pain.  Denies symptoms suggesting TIA.  Reports probable migraine headaches.  Describes a prodrome of lightheadedness and nausea.  This is followed by left scalp pain that it may eventually become severe, throbbing, and worse with movement.  Associated photophobia and sonophobia.  Continues until she goes to sleep.  Excedrin and acetaminophen are of no help.   "Frequency 1 to 2/month.  Denies fractures.  Uses oral contraceptives for birth control.      PHYSICAL EXAM    VITAL SIGNS: Blood pressure 105/75.  Heart rate 88 during examination, 105 on presentation.  Regular rate and rhythm.  Weight 57.5 kg, height 175 cm, BMI 18.7.  GENERAL PHYSICAL EXAM: Heart exam without murmur.  Regular rate and rhythm.  NEUROLOGICAL EXAM: Pleasant and cooperative.  Gets very anxious when we discussed the features of her previous convulsive seizures.  She becomes tearful, hyperventilates, and stops talking for a period of time.  With deep breathing she is able to regain her composure, apologizes, and we are able to continue with the consultation.  Normal straightaway gait.  Romberg is negative.  Visual fields are full.  Extraocular movements are intact.  No nystagmus.  Normal smooth pursuit.  Both discs are visualized and are flat.  Smile is symmetrical.  Tongue is midline.  There is no drift, pronation, or tremor.  Strength is full proximally and distally.  Reflexes are present and symmetrical in arms, active and symmetrical at knees, present and symmetrical at ankles bilaterally.  Finger finger-nose and heel-knee-shin is done well.  Vibratory sensation is about 15 seconds at right ankle.     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  May have had absence in childhood.  Some features suggest \"sunflower syndrome\", however, she does not have the cognitive impairment typically reported with this entity.  Recurrent generalized tonic-clonic seizures, initially refractory to antiseizure medications, came under control with addition of zonisamide and now controlled for 17 years.  Overall, available information consistent with primary generalized epilepsy though we do not have documentation of electrophysiologic abnormalities.  It is not " clear that she absolutely requires treatment with antiseizure medications; however, she is adamant about controlling treatment with a zonisamide and absolutely does not want to discontinue this medication.  2) migraine headaches.  These are disabling for up to 2 days/month.  Currently untreated.  3) does not appear to meet criteria for major depression or generalized anxiety disorder.  Perhaps chronic adjustment related to previous convulsions.      DISCUSSION: Above discussed frankly and supportively.  We suggested that it is not clear that she requires continuing treatment with antiseizure medications and she can was adamant that treatment with zonisamide continue.  We discussed potential teratogenicity of anticonvulsant medications and specifically zonisamide in detail.  Largest series of which I am aware includes 112 cases, 26 and monotherapy.  Small for gestational age babies occurred in 21% of those treated.  3 of 26 (around 11%) head major malformations including anencephaly and significant GI defects.  6.9% of those treated with polytherapy had malformations.  This is somewhat more than reported in previous smaller series so rates may be higher due to sampling.  These results were shared with patient.  She reported that she was not interested in having children at this time.  The need to practice strict birth control and to avoid pregnancy until she has had opportunity to discuss the situation with us in detail was emphasized.  Treatment of migraines was discussed and she wanted to proceed with this.  We came up with the following plan.      PLAN:   1) continue current antiseizure medications.  2) anticonvulsant levels today to confirm compliance rule out toxicity.  3) sumatriptan 50 mg as needed migraine headache.  May repeat in 2 hours if headache continues.  Ondansetron 4 mg every 8 hours as needed nausea.  May add Naprosyn 200 mg with persisting headaches.  4) call if worsening migraines.  Call if any  seizures.  Potential side effects of sumatriptan discussed.  5) folic acid, at least 800 mcg.  Multivitamin.  6) return to clinic in 6 months.    Total time in person today 55 minutes.  Additional 12 minutes reviewing previous records.  Additional 10 minutes generating note and coordinating care.  Total 77 minutes on day of visit.      DM (diabetes mellitus)

## 2025-03-25 NOTE — PATIENT INSTRUCTIONS
Take sumatriptan 50 mg at onset of headache. May repeat in two hours. No more than two tablets in 24 hours for now.  Can use odansetron every 8 hours for nausea for up to two days.  Can add naproxen sodium 200-250 mg. This is available over the counter.    Recommend folic acid if sexually active at least 400 micrograms, 800 micrograms is better.  You can get this over the counter. Multivitamins usually have 400 micrograms.

## 2025-03-25 NOTE — LETTER
"3/25/2025       RE: Karissa Rivera  : 1985   MRN: [id number]      Dear Colleague,    Thank you for referring your patient, Karissa Rivera, to the [facility] EPILEPSY CARE at [facility]. Please see a copy of my visit note below.    NEW EPILEPSY EVALUATION    DATE OF VISIT: 3/25/2025  NAME: Karissa Rivera  MRN: [id number]    HISTORY    EPILEPSY HISTORY:   39-year-old right-handed woman presents for further treatment of her seizures and comorbidities.  She had previously been treated by Dr. Margret Murray.    Reports onset in elementary school.  Reports a compulsion to look at the bright sun and \"shake her head back-and-forth\".  It is not clear whether involuntary movements occurred during this period of time.  It is not clear whether she had impairment, she believes not.  Infected is not clear with her absences definitely occurred but she was treated with divalproex.  Bilateral tonic-clonic seizures apparently occurred following menarche.  Lamotrigine was added to divalproex without control.  She was placed on the ketogenic diet and seizure control improved significantly.  Divalproex was discontinued and low-dose lamotrigine was maintained.  Ketogenic diet was terminated and the bilateral tonic-clonic seizures occurred.  Zonisamide was initiated in  (22 years old) and seizures came under control.  She firmly believes that zonisamide is essential for seizure control and does not want to discontinue this medication.  Further describes his seizures as follows:    SPELL TYPE 1: Spells in which she sought out self-induced photic stimulation.  Reports \"I could not help myself\".  Not clear whether impairment or movements occurred during these or not.  She has not had these since early childhood.    SPELL TYPE 2: Bilateral tonic-clonic seizures.  Reports that she experienced an aura.  However, she cannot describe this and in fact got rather upset when we tried " to get her to recall the details.  She denied serial labs ounces or jerks.  These last occurred at around age 22.    RISK FACTORS FOR EPILEPSY: Best as we can tell, early development was normal.  No febrile convulsions.  No brain infections, brain tumors, significant head trauma.    PREVIOUS EVALUATION FOR EPILEPSY: Video EEG 3/27/2023 [facility] showed mild excessive diffuse theta but no epileptiform discharges.  We do not have original reports regarding previous evaluations.  Secondhand references embedded in Dr. Antoine Mcwilliams's note (5/1/2015 [facility]) report normal MRI and EEG with generalized spike-wave during previous evaluations.    CURRENT ANTISEIZURE MEDICATIONS: Previously treated with lamotrigine which was helpful but did not control seizures.  Higher doses of levetiracetam resulted in sedation.  Zonisamide resulted in seizure control.  She denies treatment with other antiseizure medications.    SIGNIFICANT OTHER MEDICATIONS: Spironolactone.    PREVIOUS ANTISEIZURE MEDICATIONS: See above.    SIGNIFICANT PAST MEDICAL HISTORY: Denies hypertension, diabetes, lung, liver, kidney, heart disease.    FAMILY HISTORY: Reports that an aunt had recurrent spells of collapse.  Patient is not clear whether these represented syncope or seizures.  No clear history of seizures.    PSYCHOSOCIAL HISTORY: Born in [place].  Moved multiple times during her childhood associated with her father's work.  Reports stable early family life.  Denies sexual abuse.  Graduated from college.  Pursuing a masters degree in psychology specializing in marital therapy.  She is a  who competes and coaches.  .  No children.  She denies spells of depression, anhedonia, suicidal ideation, anxiety, panic attacks.  She does report significant anxiety when she thinks about her seizures and especially about discontinuing zonisamide.    REVIEW OF SYSTEMS: Denies chest pain.  Denies symptoms suggesting TIA.  Reports probable migraine  "headaches.  Describes a prodrome of lightheadedness and nausea.  This is followed by left scalp pain that it may eventually become severe, throbbing, and worse with movement.  Associated photophobia and sonophobia.  Continues until she goes to sleep.  Excedrin and acetaminophen are of no help.  Frequency 1 to 2/month.  Denies fractures.  Uses oral contraceptives for birth control.      PHYSICAL EXAM    VITAL SIGNS: Blood pressure 105/75.  Heart rate 88 during examination, 105 on presentation.  Regular rate and rhythm.  Weight 57.5 kg, height 175 cm, BMI 18.7.  GENERAL PHYSICAL EXAM: Heart exam without murmur.  Regular rate and rhythm.  NEUROLOGICAL EXAM: Pleasant and cooperative.  Gets very anxious when we discussed the features of her previous convulsive seizures.  She becomes tearful, hyperventilates, and stops talking for a period of time.  With deep breathing she is able to regain her composure, apologizes, and we are able to continue with the consultation.  Normal straightaway gait.  Romberg is negative.  Visual fields are full.  Extraocular movements are intact.  No nystagmus.  Normal smooth pursuit.  Both discs are visualized and are flat.  Smile is symmetrical.  Tongue is midline.  There is no drift, pronation, or tremor.  Strength is full proximally and distally.  Reflexes are present and symmetrical in arms, active and symmetrical at knees, present and symmetrical at ankles bilaterally.  Finger finger-nose and heel-knee-shin is done well.  Vibratory sensation is about 15 seconds at right ankle.     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  May have had absence in childhood.  Some features suggest \"sunflower syndrome\", however, she does not have the cognitive impairment typically reported with this entity.  Recurrent generalized tonic-clonic " seizures, initially refractory to antiseizure medications, came under control with addition of zonisamide and now controlled for 17 years.  Overall, available information consistent with primary generalized epilepsy though we do not have documentation of electrophysiologic abnormalities.  It is not clear that she absolutely requires treatment with antiseizure medications; however, she is adamant about controlling treatment with a zonisamide and absolutely does not want to discontinue this medication.  2) migraine headaches.  These are disabling for up to 2 days/month.  Currently untreated.  3) does not appear to meet criteria for major depression or generalized anxiety disorder.  Perhaps chronic adjustment related to previous convulsions.      DISCUSSION: Above discussed frankly and supportively.  We suggested that it is not clear that she requires continuing treatment with antiseizure medications and she can was adamant that treatment with zonisamide continue.  We discussed potential teratogenicity of anticonvulsant medications and specifically zonisamide in detail.  Largest series of which I am aware includes 112 cases, 26 and monotherapy.  Small for gestational age babies occurred in 21% of those treated.  3 of 26 (around 11%) head major malformations including anencephaly and significant GI defects.  6.9% of those treated with polytherapy had malformations.  This is somewhat more than reported in previous smaller series so rates may be higher due to sampling.  These results were shared with patient.  She reported that she was not interested in having children at this time.  The need to practice strict birth control and to avoid pregnancy until she has had opportunity to discuss the situation with us in detail was emphasized.  Treatment of migraines was discussed and she wanted to proceed with this.  We came up with the following plan.      PLAN:   1) continue current antiseizure medications.  2) anticonvulsant  levels today to confirm compliance rule out toxicity.  3) sumatriptan 50 mg as needed migraine headache.  May repeat in 2 hours if headache continues.  Ondansetron 4 mg every 8 hours as needed nausea.  May add Naprosyn 200 mg with persisting headaches.  4) call if worsening migraines.  Call if any seizures.  Potential side effects of sumatriptan discussed.  5) folic acid, at least 800 mcg.  Multivitamin.  6) return to clinic in 6 months.    Total time in person today 55 minutes.  Additional 12 minutes reviewing previous records.  Additional 10 minutes generating note and coordinating care.  Total 77 minutes on day of visit.         Again, thank you for allowing me to participate in the care of your patient.      Sincerely,    Praveen Garibay MD

## 2025-03-26 LAB — LEVETIRACETAM SERPL-MCNC: 30 ÂΜG/ML (ref 10–40)

## 2025-03-27 LAB — ZONISAMIDE SERPL-MCNC: 10 UG/ML

## 2025-06-17 ENCOUNTER — HOSPITAL ENCOUNTER (OUTPATIENT)
Dept: MRI IMAGING | Facility: CLINIC | Age: 40
Discharge: HOME | End: 2025-06-17
Attending: SURGERY
Payer: COMMERCIAL

## 2025-06-17 DIAGNOSIS — Z91.89 AT HIGH RISK FOR BREAST CANCER: ICD-10-CM

## 2025-06-17 DIAGNOSIS — R92.343 EXTREMELY DENSE TISSUE OF BOTH BREASTS ON MAMMOGRAPHY: ICD-10-CM

## 2025-06-17 PROCEDURE — 77049 MRI BREAST C-+ W/CAD BI: CPT

## 2025-06-17 PROCEDURE — 255N000002 HC RX 255 OP 636: Performed by: SURGERY

## 2025-06-17 PROCEDURE — A9585 GADOBUTROL INJECTION: HCPCS | Performed by: SURGERY

## 2025-06-17 RX ORDER — GADOBUTROL 604.72 MG/ML
6 INJECTION INTRAVENOUS ONCE
Status: COMPLETED | OUTPATIENT
Start: 2025-06-17 | End: 2025-06-17

## 2025-06-17 RX ADMIN — GADOBUTROL 6 ML: 604.72 INJECTION INTRAVENOUS at 10:21

## 2025-06-18 ENCOUNTER — TELEPHONE (OUTPATIENT)
Dept: MAMMOGRAPHY | Facility: CLINIC | Age: 40
End: 2025-06-18
Payer: COMMERCIAL

## 2025-06-18 NOTE — TELEPHONE ENCOUNTER
Left message: Good news!  Your MRI has been read as normal.    Please contact myself or your ordering provider if you have any further questions.      Marlyn Escobar RN CBCN  Nurse Coordinator [facility]  [phone]      [place], Floor